# Patient Record
Sex: MALE | Race: WHITE | NOT HISPANIC OR LATINO | Employment: OTHER | URBAN - METROPOLITAN AREA
[De-identification: names, ages, dates, MRNs, and addresses within clinical notes are randomized per-mention and may not be internally consistent; named-entity substitution may affect disease eponyms.]

---

## 2017-07-26 ENCOUNTER — RECORDS - HEALTHEAST (OUTPATIENT)
Dept: LAB | Facility: CLINIC | Age: 72
End: 2017-07-26

## 2017-07-27 LAB — HBA1C MFR BLD: 7.4 % (ref 4.2–6.1)

## 2018-01-12 ENCOUNTER — RECORDS - HEALTHEAST (OUTPATIENT)
Dept: LAB | Facility: CLINIC | Age: 73
End: 2018-01-12

## 2018-01-12 LAB
ERYTHROCYTE [SEDIMENTATION RATE] IN BLOOD BY WESTERGREN METHOD: 12 MM/HR (ref 0–15)
URATE SERPL-MCNC: 6.2 MG/DL (ref 3–8)

## 2018-01-14 LAB — HBA1C MFR BLD: 6.3 % (ref 4.2–6.1)

## 2018-01-18 ENCOUNTER — RECORDS - HEALTHEAST (OUTPATIENT)
Dept: LAB | Facility: CLINIC | Age: 73
End: 2018-01-18

## 2018-01-22 LAB
Lab: 18 NG/ML
Lab: 30 UG/24HR
Lab: <1 NG/ML
Lab: <2 UG/24 HR
Lab: <5 NG/ML
Lab: <8 UG/24HR
SPECIMEN STATUS: ABNORMAL
SPECIMEN VOL UR: 1650 ML

## 2018-03-19 ENCOUNTER — RECORDS - HEALTHEAST (OUTPATIENT)
Dept: LAB | Facility: CLINIC | Age: 73
End: 2018-03-19

## 2018-03-19 LAB
COLLECTION METHOD: NORMAL
LEAD BLD-MCNC: NORMAL UG/DL
LEAD RETEST: NO

## 2018-03-20 LAB
GUARDIAN FIRST NAME: NORMAL
GUARDIAN LAST NAME: NORMAL
HEALTH CARE PROVIDER CITY: NORMAL
HEALTH CARE PROVIDER NAME: NORMAL
HEALTH CARE PROVIDER PHONE: NORMAL
HEALTH CARE PROVIDER STATE: NORMAL
HEALTH CARE PROVIDER STREET ADDRESS: NORMAL
HEALTH CARE PROVIDER ZIP CODE: NORMAL
LEAD, B: <1 MCG/DL (ref 0–4.9)
PATIENT CITY: NORMAL
PATIENT COUNTY: NORMAL
PATIENT EMPLOYER: NORMAL
PATIENT ETHNICITY: NORMAL
PATIENT HOME PHONE: NORMAL
PATIENT OCCUPATION: NORMAL
PATIENT RACE: NORMAL
PATIENT STATE: NORMAL
PATIENT STREET ADDRESS: NORMAL
PATIENT ZIP CODE: NORMAL
SUBMITTING LABORATORY PHONE: NORMAL
VENOUS/CAPILLARY: NORMAL

## 2018-03-22 LAB
ARSENIC, WHOLE BLOOD: <10 NG/ML
MERCURY, WHOLE BLOOD - HISTORICAL: <5 NG/ML
SPECIMEN STATUS: NORMAL
SPECIMEN STATUS: NORMAL

## 2018-12-20 ENCOUNTER — RECORDS - HEALTHEAST (OUTPATIENT)
Dept: LAB | Facility: CLINIC | Age: 73
End: 2018-12-20

## 2018-12-20 LAB
ALBUMIN SERPL-MCNC: 3.7 G/DL (ref 3.5–5)
ALP SERPL-CCNC: 56 U/L (ref 45–120)
ALT SERPL W P-5'-P-CCNC: 67 U/L (ref 0–45)
ANION GAP SERPL CALCULATED.3IONS-SCNC: 12 MMOL/L (ref 5–18)
AST SERPL W P-5'-P-CCNC: 66 U/L (ref 0–40)
BASOPHILS # BLD AUTO: 0.2 THOU/UL (ref 0–0.2)
BASOPHILS NFR BLD AUTO: 2 % (ref 0–2)
BILIRUB SERPL-MCNC: 0.7 MG/DL (ref 0–1)
BUN SERPL-MCNC: 17 MG/DL (ref 8–28)
C REACTIVE PROTEIN LHE: 0.6 MG/DL (ref 0–0.8)
CALCIUM SERPL-MCNC: 9.7 MG/DL (ref 8.5–10.5)
CHLORIDE BLD-SCNC: 102 MMOL/L (ref 98–107)
CHOLEST SERPL-MCNC: 166 MG/DL
CO2 SERPL-SCNC: 24 MMOL/L (ref 22–31)
CREAT SERPL-MCNC: 0.84 MG/DL (ref 0.7–1.3)
EOSINOPHIL # BLD AUTO: 0.4 THOU/UL (ref 0–0.4)
EOSINOPHIL NFR BLD AUTO: 4 % (ref 0–6)
ERYTHROCYTE [DISTWIDTH] IN BLOOD BY AUTOMATED COUNT: 12.7 % (ref 11–14.5)
FASTING STATUS PATIENT QL REPORTED: NO
GFR SERPL CREATININE-BSD FRML MDRD: >60 ML/MIN/1.73M2
GLUCOSE BLD-MCNC: 218 MG/DL (ref 70–125)
HCT VFR BLD AUTO: 41.8 % (ref 40–54)
HDLC SERPL-MCNC: 36 MG/DL
HGB BLD-MCNC: 14.2 G/DL (ref 14–18)
LDLC SERPL CALC-MCNC: 61 MG/DL
LYMPHOCYTES # BLD AUTO: 2.4 THOU/UL (ref 0.8–4.4)
LYMPHOCYTES NFR BLD AUTO: 25 % (ref 20–40)
MCH RBC QN AUTO: 31.2 PG (ref 27–34)
MCHC RBC AUTO-ENTMCNC: 34 G/DL (ref 32–36)
MCV RBC AUTO: 92 FL (ref 80–100)
MONOCYTES # BLD AUTO: 0.8 THOU/UL (ref 0–0.9)
MONOCYTES NFR BLD AUTO: 8 % (ref 2–10)
NEUTROPHILS # BLD AUTO: 5.9 THOU/UL (ref 2–7.7)
NEUTROPHILS NFR BLD AUTO: 61 % (ref 50–70)
PLATELET # BLD AUTO: 249 THOU/UL (ref 140–440)
PMV BLD AUTO: 10.4 FL (ref 8.5–12.5)
POTASSIUM BLD-SCNC: 4.5 MMOL/L (ref 3.5–5)
PROT SERPL-MCNC: 6.8 G/DL (ref 6–8)
RBC # BLD AUTO: 4.55 MILL/UL (ref 4.4–6.2)
SODIUM SERPL-SCNC: 138 MMOL/L (ref 136–145)
TRIGL SERPL-MCNC: 343 MG/DL
WBC: 9.8 THOU/UL (ref 4–11)

## 2018-12-21 LAB — HBA1C MFR BLD: 8.1 % (ref 4.2–6.1)

## 2019-05-30 ENCOUNTER — RECORDS - HEALTHEAST (OUTPATIENT)
Dept: LAB | Facility: CLINIC | Age: 74
End: 2019-05-30

## 2019-05-30 LAB
BASOPHILS # BLD AUTO: 0.1 THOU/UL (ref 0–0.2)
BASOPHILS NFR BLD AUTO: 1 % (ref 0–2)
CRP SERPL HS-MCNC: 6.6 MG/L (ref 0–3)
EOSINOPHIL # BLD AUTO: 0.3 THOU/UL (ref 0–0.4)
EOSINOPHIL NFR BLD AUTO: 3 % (ref 0–6)
ERYTHROCYTE [DISTWIDTH] IN BLOOD BY AUTOMATED COUNT: 12.3 % (ref 11–14.5)
ERYTHROCYTE [SEDIMENTATION RATE] IN BLOOD BY WESTERGREN METHOD: 20 MM/HR (ref 0–15)
HCT VFR BLD AUTO: 41.6 % (ref 40–54)
HGB BLD-MCNC: 14.5 G/DL (ref 14–18)
LYMPHOCYTES # BLD AUTO: 2.4 THOU/UL (ref 0.8–4.4)
LYMPHOCYTES NFR BLD AUTO: 25 % (ref 20–40)
MCH RBC QN AUTO: 31.3 PG (ref 27–34)
MCHC RBC AUTO-ENTMCNC: 34.9 G/DL (ref 32–36)
MCV RBC AUTO: 90 FL (ref 80–100)
MONOCYTES # BLD AUTO: 0.8 THOU/UL (ref 0–0.9)
MONOCYTES NFR BLD AUTO: 8 % (ref 2–10)
NEUTROPHILS # BLD AUTO: 6.1 THOU/UL (ref 2–7.7)
NEUTROPHILS NFR BLD AUTO: 63 % (ref 50–70)
PLATELET # BLD AUTO: 233 THOU/UL (ref 140–440)
PMV BLD AUTO: 10 FL (ref 8.5–12.5)
RBC # BLD AUTO: 4.64 MILL/UL (ref 4.4–6.2)
URATE SERPL-MCNC: 7.5 MG/DL (ref 3–8)
WBC: 9.7 THOU/UL (ref 4–11)

## 2020-08-07 ENCOUNTER — AMBULATORY - HEALTHEAST (OUTPATIENT)
Dept: VASCULAR SURGERY | Facility: CLINIC | Age: 75
End: 2020-08-07

## 2020-08-07 ENCOUNTER — RECORDS - HEALTHEAST (OUTPATIENT)
Dept: LAB | Facility: CLINIC | Age: 75
End: 2020-08-07

## 2020-08-07 DIAGNOSIS — L97.509 DIABETIC FOOT ULCER (H): ICD-10-CM

## 2020-08-07 DIAGNOSIS — E11.621 DIABETIC FOOT ULCER (H): ICD-10-CM

## 2020-08-07 LAB
ALBUMIN SERPL-MCNC: 3.8 G/DL (ref 3.5–5)
ALP SERPL-CCNC: 60 U/L (ref 45–120)
ALT SERPL W P-5'-P-CCNC: 29 U/L (ref 0–45)
ANION GAP SERPL CALCULATED.3IONS-SCNC: 10 MMOL/L (ref 5–18)
AST SERPL W P-5'-P-CCNC: 24 U/L (ref 0–40)
BASOPHILS # BLD AUTO: 0.1 THOU/UL (ref 0–0.2)
BASOPHILS NFR BLD AUTO: 1 % (ref 0–2)
BILIRUB SERPL-MCNC: 0.5 MG/DL (ref 0–1)
BUN SERPL-MCNC: 23 MG/DL (ref 8–28)
CALCIUM SERPL-MCNC: 9.7 MG/DL (ref 8.5–10.5)
CHLORIDE BLD-SCNC: 103 MMOL/L (ref 98–107)
CHOLEST SERPL-MCNC: 175 MG/DL
CO2 SERPL-SCNC: 23 MMOL/L (ref 22–31)
CREAT SERPL-MCNC: 1.1 MG/DL (ref 0.7–1.3)
EOSINOPHIL # BLD AUTO: 0.4 THOU/UL (ref 0–0.4)
EOSINOPHIL NFR BLD AUTO: 5 % (ref 0–6)
ERYTHROCYTE [DISTWIDTH] IN BLOOD BY AUTOMATED COUNT: 12.6 % (ref 11–14.5)
ERYTHROCYTE [SEDIMENTATION RATE] IN BLOOD BY WESTERGREN METHOD: 25 MM/HR (ref 0–15)
FASTING STATUS PATIENT QL REPORTED: ABNORMAL
GFR SERPL CREATININE-BSD FRML MDRD: >60 ML/MIN/1.73M2
GLUCOSE BLD-MCNC: 298 MG/DL (ref 70–125)
HCT VFR BLD AUTO: 38.5 % (ref 40–54)
HDLC SERPL-MCNC: 36 MG/DL
HGB BLD-MCNC: 13.2 G/DL (ref 14–18)
LDLC SERPL CALC-MCNC: 72 MG/DL
LYMPHOCYTES # BLD AUTO: 2.7 THOU/UL (ref 0.8–4.4)
LYMPHOCYTES NFR BLD AUTO: 31 % (ref 20–40)
MCH RBC QN AUTO: 30.6 PG (ref 27–34)
MCHC RBC AUTO-ENTMCNC: 34.3 G/DL (ref 32–36)
MCV RBC AUTO: 89 FL (ref 80–100)
MONOCYTES # BLD AUTO: 0.8 THOU/UL (ref 0–0.9)
MONOCYTES NFR BLD AUTO: 10 % (ref 2–10)
NEUTROPHILS # BLD AUTO: 4.6 THOU/UL (ref 2–7.7)
NEUTROPHILS NFR BLD AUTO: 53 % (ref 50–70)
PLATELET # BLD AUTO: 260 THOU/UL (ref 140–440)
PMV BLD AUTO: 10.4 FL (ref 8.5–12.5)
POTASSIUM BLD-SCNC: 4.8 MMOL/L (ref 3.5–5)
PROT SERPL-MCNC: 7.1 G/DL (ref 6–8)
PSA SERPL-MCNC: 2 NG/ML (ref 0–6.5)
RBC # BLD AUTO: 4.32 MILL/UL (ref 4.4–6.2)
SODIUM SERPL-SCNC: 136 MMOL/L (ref 136–145)
TRIGL SERPL-MCNC: 334 MG/DL
WBC: 8.6 THOU/UL (ref 4–11)

## 2020-08-09 LAB — HBA1C MFR BLD: 7.4 %

## 2020-08-19 ENCOUNTER — THERAPY VISIT (OUTPATIENT)
Dept: PHYSICAL THERAPY | Facility: CLINIC | Age: 75
End: 2020-08-19
Payer: COMMERCIAL

## 2020-08-19 DIAGNOSIS — M25.511 SHOULDER PAIN, RIGHT: ICD-10-CM

## 2020-08-19 PROCEDURE — 97110 THERAPEUTIC EXERCISES: CPT | Mod: GP | Performed by: PHYSICAL THERAPIST

## 2020-08-19 PROCEDURE — 97112 NEUROMUSCULAR REEDUCATION: CPT | Mod: GP | Performed by: PHYSICAL THERAPIST

## 2020-08-19 PROCEDURE — 97162 PT EVAL MOD COMPLEX 30 MIN: CPT | Mod: GP | Performed by: PHYSICAL THERAPIST

## 2020-08-19 NOTE — PROGRESS NOTES
[facility] Initial Evaluation  Subjective:    Therapist Generated HPI Evaluation  Problem details: Pt reports an onset of R lateral shoulder pain in 08/2019 after prolonged overhead UE activity while working on a garage door. Pt reports pain is worse with reaching/lifting overhead, reaching behind back, and laying on involved side. Pt reports mild improvement with heat and tylenol. .         Type of problem:  Right shoulder.    This is a chronic condition.  Condition occurred with:  Repetition/overuse.    Patient reports pain:  Lateral.    Pain radiates to:  Upper arm.     Associated symptoms:  Loss of motion/stiffness, loss of strength and painful arc. Symptoms are exacerbated by lifting, lying on extremity, using arm at shoulder level, using arm behind back and using arm overhead  and relieved by heat and NSAID's.          Patient Health History  Symptom: R shoulder.     Date of Onset: 08/2019.      Pain is reported as 5/10 on pain scale.  General health as reported by patient is fair.  Pertinent medical history includes: asthma, diabetes, high blood pressure and overweight.   Red flags:  None as reported by patient.     Surgeries include:  Orthopedic surgery (Cervical, Lumbar Fusion ).    Current medications:  Cardiac medication and high blood pressure medication.    Occupation: retired.                                       Objective:  Standing Alignment:    Cervical/Thoracic:  Thoracic kyphosis increased and forward head                                         Shoulder Evaluation:  ROM:  AROM:    Flexion:  Left:  130    Right:  110    Abduction:  Left: 120   Right:  100    Internal Rotation:  Left:  Thumb to L1     Right:  Thumb to greater trochanter  External Rotation:  Left:  60    Right:  10                PROM:    Flexion:  Left:  130    Right: 110      Abduction:  Left:  120    Right:  105      External Rotation:  Left:  65    Right:  15                    Strength:        Abduction:   Left: 5/5  Pain:    Right: 4-/5   Weak/painful    Pain:    Internal Rotation:  Left:5/5     Pain:    Right: 5/5     Pain:  External Rotation:   Left:5-/5     Pain:   Right:3+/5    Weak/painful    Pain:++                Palpation:      Right shoulder tenderness present at: Supraspinatus and Infraspinatus  Mobility Tests:      Glenohumeral posterior right:  Hypomobile                                             General     ROS    Assessment/Plan:    Patient is a 75 year old male with right side shoulder complaints.    Patient has the following significant findings with corresponding treatment plan.                Diagnosis 1:  R shoulder pain  Pain -  manual therapy, splint/taping/bracing/orthotics and self management  Decreased ROM/flexibility - manual therapy and therapeutic exercise  Decreased joint mobility - manual therapy and therapeutic exercise  Decreased strength - therapeutic exercise and therapeutic activities  Impaired muscle performance - neuro re-education  Impaired posture - neuro re-education    Therapy Evaluation Codes:   1) History comprised of:   Personal factors that impact the plan of care:      Age, Past/current experiences and Time since onset of symptoms.    Comorbidity factors that impact the plan of care are:      Heart problems and Overweight.     Medications impacting care: Cardiac.  2) Examination of Body Systems comprised of:   Body structures and functions that impact the plan of care:      Cervical spine and Shoulder.   Activity limitations that impact the plan of care are:      Bathing, Cooking, Driving, Dressing and Lifting.  3) Clinical presentation characteristics are:   Evolving/Changing.  4) Decision-Making    Moderate complexity using standardized patient assessment instrument and/or measureable assessment of functional outcome.  Cumulative Therapy Evaluation is: Moderate complexity.    Previous and current functional limitations:  (See Goal Flow Sheet for this information)     Short term and Long term goals: (See Goal Flow Sheet for this information)     Communication ability:  Patient appears to be able to clearly communicate and understand verbal and written communication and follow directions correctly.  Treatment Explanation - The following has been discussed with the patient:   RX ordered/plan of care  Anticipated outcomes  Possible risks and side effects  This patient would benefit from PT intervention to resume normal activities.   Rehab potential is good.    Frequency:  1 X week, once daily  Duration:  for 6 weeks  Discharge Plan:  Achieve all LTG.  Independent in home treatment program.  Reach maximal therapeutic benefit.    Please refer to the daily flowsheet for treatment today, total treatment time and time spent performing 1:1 timed codes.

## 2020-08-21 ENCOUNTER — COMMUNICATION - HEALTHEAST (OUTPATIENT)
Dept: VASCULAR SURGERY | Facility: CLINIC | Age: 75
End: 2020-08-21

## 2020-08-24 ENCOUNTER — OFFICE VISIT - HEALTHEAST (OUTPATIENT)
Dept: VASCULAR SURGERY | Facility: CLINIC | Age: 75
End: 2020-08-24

## 2020-08-24 DIAGNOSIS — L97.521 DIABETIC ULCER OF LEFT FOOT ASSOCIATED WITH TYPE 2 DIABETES MELLITUS, LIMITED TO BREAKDOWN OF SKIN, UNSPECIFIED PART OF FOOT (H): ICD-10-CM

## 2020-08-24 DIAGNOSIS — E11.621 DIABETIC ULCER OF LEFT FOOT ASSOCIATED WITH TYPE 2 DIABETES MELLITUS, LIMITED TO BREAKDOWN OF SKIN, UNSPECIFIED PART OF FOOT (H): ICD-10-CM

## 2020-08-24 DIAGNOSIS — M25.372 ANKLE INSTABILITY, LEFT: ICD-10-CM

## 2020-08-24 ASSESSMENT — MIFFLIN-ST. JEOR: SCORE: 1682.93

## 2020-09-04 ENCOUNTER — THERAPY VISIT (OUTPATIENT)
Dept: PHYSICAL THERAPY | Facility: CLINIC | Age: 75
End: 2020-09-04
Payer: COMMERCIAL

## 2020-09-04 DIAGNOSIS — M25.511 SHOULDER PAIN, RIGHT: ICD-10-CM

## 2020-09-04 PROCEDURE — 97110 THERAPEUTIC EXERCISES: CPT | Mod: GP | Performed by: PHYSICAL THERAPIST

## 2020-09-04 PROCEDURE — 97112 NEUROMUSCULAR REEDUCATION: CPT | Mod: GP | Performed by: PHYSICAL THERAPIST

## 2020-09-11 ENCOUNTER — THERAPY VISIT (OUTPATIENT)
Dept: PHYSICAL THERAPY | Facility: CLINIC | Age: 75
End: 2020-09-11
Payer: COMMERCIAL

## 2020-09-11 DIAGNOSIS — M25.511 SHOULDER PAIN, RIGHT: ICD-10-CM

## 2020-09-11 PROCEDURE — 97110 THERAPEUTIC EXERCISES: CPT | Mod: GP | Performed by: PHYSICAL THERAPIST

## 2020-09-11 PROCEDURE — 97112 NEUROMUSCULAR REEDUCATION: CPT | Mod: GP | Performed by: PHYSICAL THERAPIST

## 2020-09-16 ENCOUNTER — OFFICE VISIT - HEALTHEAST (OUTPATIENT)
Dept: VASCULAR SURGERY | Facility: CLINIC | Age: 75
End: 2020-09-16

## 2020-09-16 DIAGNOSIS — E11.621 DIABETIC ULCER OF LEFT FOOT ASSOCIATED WITH TYPE 2 DIABETES MELLITUS, LIMITED TO BREAKDOWN OF SKIN, UNSPECIFIED PART OF FOOT (H): ICD-10-CM

## 2020-09-16 DIAGNOSIS — L97.521 DIABETIC ULCER OF LEFT FOOT ASSOCIATED WITH TYPE 2 DIABETES MELLITUS, LIMITED TO BREAKDOWN OF SKIN, UNSPECIFIED PART OF FOOT (H): ICD-10-CM

## 2020-09-18 ENCOUNTER — THERAPY VISIT (OUTPATIENT)
Dept: PHYSICAL THERAPY | Facility: CLINIC | Age: 75
End: 2020-09-18
Payer: COMMERCIAL

## 2020-09-18 DIAGNOSIS — M25.511 SHOULDER PAIN, RIGHT: ICD-10-CM

## 2020-09-18 PROCEDURE — 97110 THERAPEUTIC EXERCISES: CPT | Mod: GP | Performed by: PHYSICAL THERAPIST

## 2020-09-18 PROCEDURE — 97112 NEUROMUSCULAR REEDUCATION: CPT | Mod: GP | Performed by: PHYSICAL THERAPIST

## 2020-09-18 NOTE — PROGRESS NOTES
PROGRESS  REPORT    Progress reporting period is from 8/19/20 to 9/18/20. Pt has attended 4 PT sessions addressing patient's chief complaints of R lateral shoulder pain w/ an onset in 08/2019 after prolonged overhead UE activity while working on a garage door.     Pt reports minimal improvement since the onset of pain and continues to have difficulty reaching his arm overhead. Examination reveals painful loss of R shoulder ER MMT: 4-/5 and PT recommends patient f/u with MD due to RTC weakness and lack of improvement.     SUBJECTIVE  Subjective changes noted by patient:  Pt reports feeling about the same overall.        Initial Pain level: 4/10.   Changes in function: No  Adverse reaction to treatment or activity: None    OBJECTIVE  Changes noted in objective findings:  Yes,   Shoulder Evaluation:  ROM:  AROM:    Flexion:  Left:  130    Right:  110     Abduction:  Left: 120   Right:  100     Internal Rotation:  Left:  Thumb to L1     Right:  Thumb to greater trochanter  External Rotation:  Left:  60    Right:  10           PROM:    Flexion:  Left:  130    Right: 110       Abduction:  Left:  120    Right:  105        External Rotation:  Left:  65    Right:  15     Strength:          Abduction:  Left: 5/5  Pain:    Right: 4-/5   Weak/painful    Pain:     Internal Rotation:  Left:5/5     Pain:    Right: 5/5     Pain:  External Rotation:   Left:5-/5     Pain:   Right:4-/5    Weak/painful    Pain:++        ASSESSMENT/PLAN  Updated problem list and treatment plan: Diagnosis 1:  R RTC tear    STG/LTGs have been met or progress has been made towards goals:  None  Assessment of Progress: The patient's condition is unchanged.  Self Management Plans:  Patient has been instructed in a home treatment program.  I have re-evaluated this patient and find that the nature, scope, duration and intensity of the therapy is appropriate for the medical condition of the patient.  Prosper continues to require the following intervention to  meet STG and LTG's:  PT intervention is no longer required to meet STG/LTG.    Recommendations:  This patient would benefit from further evaluation.    Please refer to the daily flowsheet for treatment today, total treatment time and time spent performing 1:1 timed codes.

## 2020-09-18 NOTE — LETTER
[facility] PHYSICAL THERAPY  [address] [place] [address]  [phone]    2020    Re: Prosper Kirby   :   1945  MRN:  [id number]   REFERRING PHYSICIAN:   Alin Fuentes    [facility] PHYSICAL [facility]    Date of Initial Evaluation:  2020  Visits:  Rxs Used: 4  Reason for Referral:  Shoulder pain, right    EVALUATION SUMMARY    PROGRESS  REPORT    Progress reporting period is from 20 to 20. Pt has attended 4 PT sessions addressing patient's chief complaints of R lateral shoulder pain w/ an onset in 2019 after prolonged overhead UE activity while working on a garage door. Pt reports minimal improvement since the onset of pain and continues to have difficulty reaching his arm overhead. Examination reveals painful loss of R shoulder ER MMT: 4-/5 and PT recommends patient f/u with MD due to RTC weakness and lack of improvement.     SUBJECTIVE  Subjective changes noted by patient:  Pt reports feeling about the same overall.        Initial Pain level: 4/10.   Changes in function: No  Adverse reaction to treatment or activity: None    OBJECTIVE  Changes noted in objective findings:  Yes,   Shoulder Evaluation:  ROM:  AROM:    Flexion:  Left:  130    Right:  110   Abduction:  Left: 120   Right:  100   Internal Rotation:  Left:  Thumb to L1     Right:  Thumb to greater trochanter  External Rotation:  Left:  60    Right:  10   PROM:    Flexion:  Left:  130    Right: 110    Abduction:  Left:  120    Right:  105  External Rotation:  Left:  65    Right:  15   Strength:     Abduction:  Left: 5/5  Pain:    Right: 4-/5   Weak/painful    Pain:   Internal Rotation:  Left:5/5     Pain:    Right: 5/5     Pain:  External Rotation:   Left:5-/5     Pain:   Right:4-/5    Weak/painful    Pain:++        ASSESSMENT/PLAN  Updated problem list and treatment plan: Diagnosis 1:  R RTC tear    STG/LTGs have been met or  progress has been made towards goals:  None  Assessment of Progress: The patient's condition is unchanged.  Self Management Plans:  Patient has been instructed in a home treatment program.  I have re-evaluated this patient and find that the nature, scope, duration and intensity of the therapy is appropriate for the medical condition of the patient.  Prosper continues to require the following intervention to meet STG and LTG's:  PT intervention is no longer required to meet STG/LTG.    Recommendations:  This patient would benefit from further evaluation.        Thank you for your referral.    INQUIRIES  Therapist: Luther Reddy DPT   [facility] PHYSICAL THERAPY  [address] [place] [address]  Phone: [phone]  Fax: [phone]

## 2020-10-07 ENCOUNTER — OFFICE VISIT - HEALTHEAST (OUTPATIENT)
Dept: VASCULAR SURGERY | Facility: CLINIC | Age: 75
End: 2020-10-07

## 2020-10-07 DIAGNOSIS — L97.521 DIABETIC ULCER OF LEFT FOOT ASSOCIATED WITH TYPE 2 DIABETES MELLITUS, LIMITED TO BREAKDOWN OF SKIN, UNSPECIFIED PART OF FOOT (H): ICD-10-CM

## 2020-10-07 DIAGNOSIS — E11.621 DIABETIC ULCER OF LEFT FOOT ASSOCIATED WITH TYPE 2 DIABETES MELLITUS, LIMITED TO BREAKDOWN OF SKIN, UNSPECIFIED PART OF FOOT (H): ICD-10-CM

## 2020-10-28 ENCOUNTER — OFFICE VISIT - HEALTHEAST (OUTPATIENT)
Dept: VASCULAR SURGERY | Facility: CLINIC | Age: 75
End: 2020-10-28

## 2020-10-28 DIAGNOSIS — E11.621 DIABETIC ULCER OF OTHER PART OF LEFT FOOT ASSOCIATED WITH TYPE 2 DIABETES MELLITUS, WITH NECROSIS OF MUSCLE (H): ICD-10-CM

## 2020-10-28 DIAGNOSIS — L97.523 DIABETIC ULCER OF OTHER PART OF LEFT FOOT ASSOCIATED WITH TYPE 2 DIABETES MELLITUS, WITH NECROSIS OF MUSCLE (H): ICD-10-CM

## 2020-10-28 ASSESSMENT — MIFFLIN-ST. JEOR: SCORE: 1692.01

## 2020-10-30 ENCOUNTER — AMBULATORY - HEALTHEAST (OUTPATIENT)
Dept: VASCULAR SURGERY | Facility: CLINIC | Age: 75
End: 2020-10-30

## 2020-10-30 DIAGNOSIS — E11.621 DIABETIC ULCER OF OTHER PART OF LEFT FOOT ASSOCIATED WITH TYPE 2 DIABETES MELLITUS, WITH NECROSIS OF MUSCLE (H): ICD-10-CM

## 2020-10-30 DIAGNOSIS — L97.523 DIABETIC ULCER OF OTHER PART OF LEFT FOOT ASSOCIATED WITH TYPE 2 DIABETES MELLITUS, WITH NECROSIS OF MUSCLE (H): ICD-10-CM

## 2020-10-31 ENCOUNTER — HOME CARE/HOSPICE - HEALTHEAST (OUTPATIENT)
Dept: HOME HEALTH SERVICES | Facility: HOME HEALTH | Age: 75
End: 2020-10-31

## 2020-11-13 ENCOUNTER — COMMUNICATION - HEALTHEAST (OUTPATIENT)
Dept: VASCULAR SURGERY | Facility: CLINIC | Age: 75
End: 2020-11-13

## 2020-11-18 ENCOUNTER — COMMUNICATION - HEALTHEAST (OUTPATIENT)
Dept: VASCULAR SURGERY | Facility: CLINIC | Age: 75
End: 2020-11-18

## 2020-11-18 ENCOUNTER — OFFICE VISIT - HEALTHEAST (OUTPATIENT)
Dept: VASCULAR SURGERY | Facility: CLINIC | Age: 75
End: 2020-11-18

## 2020-11-18 ENCOUNTER — SURGERY - HEALTHEAST (OUTPATIENT)
Dept: VASCULAR SURGERY | Facility: CLINIC | Age: 75
End: 2020-11-18

## 2020-11-18 ENCOUNTER — AMBULATORY - HEALTHEAST (OUTPATIENT)
Dept: SURGERY | Facility: HOSPITAL | Age: 75
End: 2020-11-18

## 2020-11-18 DIAGNOSIS — Z11.59 ENCOUNTER FOR SCREENING FOR OTHER VIRAL DISEASES: ICD-10-CM

## 2020-11-18 DIAGNOSIS — L97.523 DIABETIC ULCER OF LEFT FOOT ASSOCIATED WITH TYPE 2 DIABETES MELLITUS, WITH NECROSIS OF MUSCLE, UNSPECIFIED PART OF FOOT (H): ICD-10-CM

## 2020-11-18 DIAGNOSIS — E11.621 DIABETIC ULCER OF OTHER PART OF LEFT FOOT ASSOCIATED WITH TYPE 2 DIABETES MELLITUS, WITH NECROSIS OF MUSCLE (H): ICD-10-CM

## 2020-11-18 DIAGNOSIS — L97.523 DIABETIC ULCER OF OTHER PART OF LEFT FOOT ASSOCIATED WITH TYPE 2 DIABETES MELLITUS, WITH NECROSIS OF MUSCLE (H): ICD-10-CM

## 2020-11-18 DIAGNOSIS — E11.621 DIABETIC ULCER OF LEFT FOOT ASSOCIATED WITH TYPE 2 DIABETES MELLITUS, WITH NECROSIS OF MUSCLE, UNSPECIFIED PART OF FOOT (H): ICD-10-CM

## 2020-11-25 ENCOUNTER — RECORDS - HEALTHEAST (OUTPATIENT)
Dept: LAB | Facility: CLINIC | Age: 75
End: 2020-11-25

## 2020-11-25 LAB
HGB BLD-MCNC: 13.5 G/DL (ref 14–18)
POTASSIUM BLD-SCNC: 4.9 MMOL/L (ref 3.5–5)

## 2020-11-30 ENCOUNTER — AMBULATORY - HEALTHEAST (OUTPATIENT)
Dept: LAB | Facility: CLINIC | Age: 75
End: 2020-11-30

## 2020-11-30 DIAGNOSIS — Z11.59 ENCOUNTER FOR SCREENING FOR OTHER VIRAL DISEASES: ICD-10-CM

## 2020-12-02 ENCOUNTER — COMMUNICATION - HEALTHEAST (OUTPATIENT)
Dept: SCHEDULING | Facility: CLINIC | Age: 75
End: 2020-12-02

## 2020-12-02 ENCOUNTER — ANESTHESIA - HEALTHEAST (OUTPATIENT)
Dept: SURGERY | Facility: HOSPITAL | Age: 75
End: 2020-12-02

## 2020-12-02 ASSESSMENT — MIFFLIN-ST. JEOR: SCORE: 1705.61

## 2020-12-03 ENCOUNTER — SURGERY - HEALTHEAST (OUTPATIENT)
Dept: SURGERY | Facility: HOSPITAL | Age: 75
End: 2020-12-03

## 2020-12-07 ENCOUNTER — COMMUNICATION - HEALTHEAST (OUTPATIENT)
Dept: VASCULAR SURGERY | Facility: CLINIC | Age: 75
End: 2020-12-07

## 2020-12-09 ENCOUNTER — RECORDS - HEALTHEAST (OUTPATIENT)
Dept: ADMINISTRATIVE | Facility: OTHER | Age: 75
End: 2020-12-09

## 2020-12-10 ENCOUNTER — OFFICE VISIT - HEALTHEAST (OUTPATIENT)
Dept: VASCULAR SURGERY | Facility: CLINIC | Age: 75
End: 2020-12-10

## 2020-12-10 ENCOUNTER — COMMUNICATION - HEALTHEAST (OUTPATIENT)
Dept: VASCULAR SURGERY | Facility: CLINIC | Age: 75
End: 2020-12-10

## 2020-12-10 DIAGNOSIS — L97.523 DIABETIC ULCER OF LEFT FOOT ASSOCIATED WITH TYPE 2 DIABETES MELLITUS, WITH NECROSIS OF MUSCLE, UNSPECIFIED PART OF FOOT (H): ICD-10-CM

## 2020-12-10 DIAGNOSIS — E11.621 DIABETIC ULCER OF LEFT FOOT ASSOCIATED WITH TYPE 2 DIABETES MELLITUS, WITH NECROSIS OF MUSCLE, UNSPECIFIED PART OF FOOT (H): ICD-10-CM

## 2020-12-23 ENCOUNTER — OFFICE VISIT - HEALTHEAST (OUTPATIENT)
Dept: VASCULAR SURGERY | Facility: CLINIC | Age: 75
End: 2020-12-23

## 2020-12-23 DIAGNOSIS — E11.621 DIABETIC ULCER OF LEFT FOOT ASSOCIATED WITH TYPE 2 DIABETES MELLITUS, WITH NECROSIS OF MUSCLE, UNSPECIFIED PART OF FOOT (H): ICD-10-CM

## 2020-12-23 DIAGNOSIS — L97.523 DIABETIC ULCER OF LEFT FOOT ASSOCIATED WITH TYPE 2 DIABETES MELLITUS, WITH NECROSIS OF MUSCLE, UNSPECIFIED PART OF FOOT (H): ICD-10-CM

## 2021-01-05 ENCOUNTER — COMMUNICATION - HEALTHEAST (OUTPATIENT)
Dept: VASCULAR SURGERY | Facility: CLINIC | Age: 76
End: 2021-01-05

## 2021-01-05 ENCOUNTER — SURGERY - HEALTHEAST (OUTPATIENT)
Dept: PODIATRY | Facility: CLINIC | Age: 76
End: 2021-01-05

## 2021-01-05 DIAGNOSIS — M86.9 OSTEOMYELITIS OF ANKLE AND FOOT (H): ICD-10-CM

## 2021-01-05 ASSESSMENT — MIFFLIN-ST. JEOR
SCORE: 1705.61
SCORE: 1692.01
SCORE: 1692.01
SCORE: 1705.61

## 2021-01-06 ENCOUNTER — SURGERY - HEALTHEAST (OUTPATIENT)
Dept: SURGERY | Facility: HOSPITAL | Age: 76
End: 2021-01-06

## 2021-01-06 ENCOUNTER — ANESTHESIA - HEALTHEAST (OUTPATIENT)
Dept: SURGERY | Facility: HOSPITAL | Age: 76
End: 2021-01-06

## 2021-01-06 ENCOUNTER — SURGERY - HEALTHEAST (OUTPATIENT)
Dept: VASCULAR SURGERY | Facility: CLINIC | Age: 76
End: 2021-01-06

## 2021-01-06 DIAGNOSIS — M86.9 OSTEOMYELITIS OF ANKLE AND FOOT (H): ICD-10-CM

## 2021-01-07 ENCOUNTER — COMMUNICATION - HEALTHEAST (OUTPATIENT)
Dept: VASCULAR SURGERY | Facility: CLINIC | Age: 76
End: 2021-01-07

## 2021-01-08 ENCOUNTER — SURGERY - HEALTHEAST (OUTPATIENT)
Dept: SURGERY | Facility: HOSPITAL | Age: 76
End: 2021-01-08

## 2021-01-08 ENCOUNTER — ANESTHESIA - HEALTHEAST (OUTPATIENT)
Dept: SURGERY | Facility: HOSPITAL | Age: 76
End: 2021-01-08

## 2021-01-14 ENCOUNTER — COMMUNICATION - HEALTHEAST (OUTPATIENT)
Dept: INFECTIOUS DISEASES | Facility: CLINIC | Age: 76
End: 2021-01-14

## 2021-01-19 ENCOUNTER — RECORDS - HEALTHEAST (OUTPATIENT)
Dept: ADMINISTRATIVE | Facility: OTHER | Age: 76
End: 2021-01-19

## 2021-01-19 LAB
CREAT SERPL-MCNC: 0.64 MG/DL (ref 0.73–1.18)
GFR ESTIMATE EXT - HISTORICAL: >60 ML/MIN/1.73M2

## 2021-01-20 ENCOUNTER — OFFICE VISIT - HEALTHEAST (OUTPATIENT)
Dept: VASCULAR SURGERY | Facility: CLINIC | Age: 76
End: 2021-01-20

## 2021-01-20 DIAGNOSIS — M86.9 OSTEOMYELITIS OF ANKLE AND FOOT (H): ICD-10-CM

## 2021-01-26 ENCOUNTER — RECORDS - HEALTHEAST (OUTPATIENT)
Dept: ADMINISTRATIVE | Facility: OTHER | Age: 76
End: 2021-01-26

## 2021-01-26 LAB
CREAT SERPL-MCNC: 0.67 MG/DL (ref 0.73–1.18)
GFR ESTIMATE EXT - HISTORICAL: >60 ML/MIN/1.73M2

## 2021-01-27 ENCOUNTER — COMMUNICATION - HEALTHEAST (OUTPATIENT)
Dept: INFECTIOUS DISEASES | Facility: CLINIC | Age: 76
End: 2021-01-27

## 2021-01-29 ENCOUNTER — COMMUNICATION - HEALTHEAST (OUTPATIENT)
Dept: VASCULAR SURGERY | Facility: CLINIC | Age: 76
End: 2021-01-29

## 2021-02-08 ENCOUNTER — RECORDS - HEALTHEAST (OUTPATIENT)
Dept: HEALTH INFORMATION MANAGEMENT | Facility: CLINIC | Age: 76
End: 2021-02-08

## 2021-02-11 ENCOUNTER — OFFICE VISIT - HEALTHEAST (OUTPATIENT)
Dept: INFECTIOUS DISEASES | Facility: CLINIC | Age: 76
End: 2021-02-11

## 2021-02-11 ENCOUNTER — OFFICE VISIT - HEALTHEAST (OUTPATIENT)
Dept: VASCULAR SURGERY | Facility: CLINIC | Age: 76
End: 2021-02-11

## 2021-02-11 DIAGNOSIS — L08.9 DIABETIC FOOT INFECTION (H): ICD-10-CM

## 2021-02-11 DIAGNOSIS — M86.9 OSTEOMYELITIS OF ANKLE AND FOOT (H): ICD-10-CM

## 2021-02-11 DIAGNOSIS — E11.628 DIABETIC FOOT INFECTION (H): ICD-10-CM

## 2021-02-17 ENCOUNTER — COMMUNICATION - HEALTHEAST (OUTPATIENT)
Dept: OTHER | Facility: CLINIC | Age: 76
End: 2021-02-17

## 2021-02-17 ENCOUNTER — COMMUNICATION - HEALTHEAST (OUTPATIENT)
Dept: VASCULAR SURGERY | Facility: CLINIC | Age: 76
End: 2021-02-17

## 2021-02-18 ENCOUNTER — RECORDS - HEALTHEAST (OUTPATIENT)
Dept: ADMINISTRATIVE | Facility: OTHER | Age: 76
End: 2021-02-18

## 2021-02-19 ENCOUNTER — COMMUNICATION - HEALTHEAST (OUTPATIENT)
Dept: VASCULAR SURGERY | Facility: CLINIC | Age: 76
End: 2021-02-19

## 2021-02-22 ENCOUNTER — RECORDS - HEALTHEAST (OUTPATIENT)
Dept: LAB | Facility: CLINIC | Age: 76
End: 2021-02-22

## 2021-02-22 LAB
CHOLEST SERPL-MCNC: 146 MG/DL
FASTING STATUS PATIENT QL REPORTED: NO
HBA1C MFR BLD: 5.9 %
HDLC SERPL-MCNC: 31 MG/DL
LDLC SERPL CALC-MCNC: 83 MG/DL
TRIGL SERPL-MCNC: 160 MG/DL

## 2021-02-23 ENCOUNTER — AMBULATORY - HEALTHEAST (OUTPATIENT)
Dept: OTHER | Facility: CLINIC | Age: 76
End: 2021-02-23

## 2021-03-04 ENCOUNTER — RECORDS - HEALTHEAST (OUTPATIENT)
Dept: ADMINISTRATIVE | Facility: OTHER | Age: 76
End: 2021-03-04

## 2021-03-08 ENCOUNTER — AMBULATORY - HEALTHEAST (OUTPATIENT)
Dept: NURSING | Facility: CLINIC | Age: 76
End: 2021-03-08

## 2021-03-09 ENCOUNTER — AMBULATORY - HEALTHEAST (OUTPATIENT)
Dept: VASCULAR SURGERY | Facility: CLINIC | Age: 76
End: 2021-03-09

## 2021-03-09 DIAGNOSIS — L97.509 TOE ULCER (H): ICD-10-CM

## 2021-03-10 ENCOUNTER — AMBULATORY - HEALTHEAST (OUTPATIENT)
Dept: OTHER | Facility: CLINIC | Age: 76
End: 2021-03-10

## 2021-03-15 ENCOUNTER — AMBULATORY - HEALTHEAST (OUTPATIENT)
Dept: VASCULAR SURGERY | Facility: CLINIC | Age: 76
End: 2021-03-15

## 2021-03-15 ENCOUNTER — COMMUNICATION - HEALTHEAST (OUTPATIENT)
Dept: VASCULAR SURGERY | Facility: CLINIC | Age: 76
End: 2021-03-15

## 2021-03-15 DIAGNOSIS — M86.9 OSTEOMYELITIS OF ANKLE AND FOOT (H): ICD-10-CM

## 2021-03-15 DIAGNOSIS — I73.9 PAD (PERIPHERAL ARTERY DISEASE) (H): ICD-10-CM

## 2021-03-16 ENCOUNTER — OFFICE VISIT - HEALTHEAST (OUTPATIENT)
Dept: VASCULAR SURGERY | Facility: CLINIC | Age: 76
End: 2021-03-16

## 2021-03-16 ENCOUNTER — RECORDS - HEALTHEAST (OUTPATIENT)
Dept: VASCULAR ULTRASOUND | Facility: CLINIC | Age: 76
End: 2021-03-16

## 2021-03-16 DIAGNOSIS — E11.621 DIABETIC ULCER OF LEFT FOOT ASSOCIATED WITH TYPE 2 DIABETES MELLITUS, WITH NECROSIS OF MUSCLE, UNSPECIFIED PART OF FOOT (H): ICD-10-CM

## 2021-03-16 DIAGNOSIS — I73.9 PERIPHERAL VASCULAR DISEASE, UNSPECIFIED (H): ICD-10-CM

## 2021-03-16 DIAGNOSIS — M86.9 OSTEOMYELITIS, UNSPECIFIED (H): ICD-10-CM

## 2021-03-16 DIAGNOSIS — L97.524: ICD-10-CM

## 2021-03-16 DIAGNOSIS — M86.9 OSTEOMYELITIS OF LEFT FOOT, UNSPECIFIED TYPE (H): ICD-10-CM

## 2021-03-16 DIAGNOSIS — E11.42 DIABETIC PERIPHERAL NEUROPATHY ASSOCIATED WITH TYPE 2 DIABETES MELLITUS (H): ICD-10-CM

## 2021-03-16 DIAGNOSIS — L97.523 DIABETIC ULCER OF LEFT FOOT ASSOCIATED WITH TYPE 2 DIABETES MELLITUS, WITH NECROSIS OF MUSCLE, UNSPECIFIED PART OF FOOT (H): ICD-10-CM

## 2021-03-16 ASSESSMENT — MIFFLIN-ST. JEOR: SCORE: 1628.5

## 2021-03-18 LAB
BACTERIA SPEC CULT: NO GROWTH
GRAM STAIN RESULT: NORMAL
GRAM STAIN RESULT: NORMAL

## 2021-03-22 ENCOUNTER — AMBULATORY - HEALTHEAST (OUTPATIENT)
Dept: VASCULAR SURGERY | Facility: CLINIC | Age: 76
End: 2021-03-22

## 2021-03-22 ENCOUNTER — SURGERY - HEALTHEAST (OUTPATIENT)
Dept: PODIATRY | Facility: CLINIC | Age: 76
End: 2021-03-22

## 2021-03-22 ENCOUNTER — OFFICE VISIT - HEALTHEAST (OUTPATIENT)
Dept: PODIATRY | Facility: CLINIC | Age: 76
End: 2021-03-22

## 2021-03-22 DIAGNOSIS — L02.619 CELLULITIS AND ABSCESS OF FOOT EXCLUDING TOE: ICD-10-CM

## 2021-03-22 DIAGNOSIS — L08.9 DIABETIC FOOT INFECTION (H): ICD-10-CM

## 2021-03-22 DIAGNOSIS — L03.119 CELLULITIS AND ABSCESS OF FOOT EXCLUDING TOE: ICD-10-CM

## 2021-03-22 DIAGNOSIS — L97.524 DIABETIC ULCER OF TOE OF LEFT FOOT ASSOCIATED WITH TYPE 2 DIABETES MELLITUS, WITH NECROSIS OF BONE (H): ICD-10-CM

## 2021-03-22 DIAGNOSIS — M86.9 OSTEOMYELITIS OF ANKLE AND FOOT (H): ICD-10-CM

## 2021-03-22 DIAGNOSIS — E11.628 DIABETIC FOOT INFECTION (H): ICD-10-CM

## 2021-03-22 DIAGNOSIS — E11.621 DIABETIC ULCER OF TOE OF LEFT FOOT ASSOCIATED WITH TYPE 2 DIABETES MELLITUS, WITH NECROSIS OF BONE (H): ICD-10-CM

## 2021-03-23 ENCOUNTER — COMMUNICATION - HEALTHEAST (OUTPATIENT)
Dept: VASCULAR SURGERY | Facility: CLINIC | Age: 76
End: 2021-03-23

## 2021-03-25 ENCOUNTER — RECORDS - HEALTHEAST (OUTPATIENT)
Dept: LAB | Facility: CLINIC | Age: 76
End: 2021-03-25

## 2021-03-25 LAB
ALBUMIN SERPL-MCNC: 3.8 G/DL (ref 3.5–5)
ALP SERPL-CCNC: 67 U/L (ref 45–120)
ALT SERPL W P-5'-P-CCNC: 25 U/L (ref 0–45)
ANION GAP SERPL CALCULATED.3IONS-SCNC: 11 MMOL/L (ref 5–18)
AST SERPL W P-5'-P-CCNC: 25 U/L (ref 0–40)
BASOPHILS # BLD AUTO: 0.1 THOU/UL (ref 0–0.2)
BASOPHILS NFR BLD AUTO: 2 % (ref 0–2)
BILIRUB SERPL-MCNC: 0.4 MG/DL (ref 0–1)
BUN SERPL-MCNC: 25 MG/DL (ref 8–28)
CALCIUM SERPL-MCNC: 9.6 MG/DL (ref 8.5–10.5)
CHLORIDE BLD-SCNC: 101 MMOL/L (ref 98–107)
CO2 SERPL-SCNC: 22 MMOL/L (ref 22–31)
CREAT SERPL-MCNC: 0.9 MG/DL (ref 0.7–1.3)
EOSINOPHIL # BLD AUTO: 0.8 THOU/UL (ref 0–0.4)
EOSINOPHIL NFR BLD AUTO: 10 % (ref 0–6)
ERYTHROCYTE [DISTWIDTH] IN BLOOD BY AUTOMATED COUNT: 14 % (ref 11–14.5)
GFR SERPL CREATININE-BSD FRML MDRD: >60 ML/MIN/1.73M2
GLUCOSE BLD-MCNC: 239 MG/DL (ref 70–125)
HBA1C MFR BLD: 5.7 %
HCT VFR BLD AUTO: 38.3 % (ref 40–54)
HGB BLD-MCNC: 12.9 G/DL (ref 14–18)
IMM GRANULOCYTES # BLD: 0 THOU/UL
IMM GRANULOCYTES NFR BLD: 0 %
LYMPHOCYTES # BLD AUTO: 1.6 THOU/UL (ref 0.8–4.4)
LYMPHOCYTES NFR BLD AUTO: 19 % (ref 20–40)
MCH RBC QN AUTO: 29.8 PG (ref 27–34)
MCHC RBC AUTO-ENTMCNC: 33.7 G/DL (ref 32–36)
MCV RBC AUTO: 89 FL (ref 80–100)
MONOCYTES # BLD AUTO: 0.7 THOU/UL (ref 0–0.9)
MONOCYTES NFR BLD AUTO: 9 % (ref 2–10)
NEUTROPHILS # BLD AUTO: 4.9 THOU/UL (ref 2–7.7)
NEUTROPHILS NFR BLD AUTO: 60 % (ref 50–70)
PLATELET # BLD AUTO: 285 THOU/UL (ref 140–440)
PMV BLD AUTO: 9.6 FL (ref 8.5–12.5)
POTASSIUM BLD-SCNC: 4.9 MMOL/L (ref 3.5–5)
PROT SERPL-MCNC: 7.3 G/DL (ref 6–8)
RBC # BLD AUTO: 4.33 MILL/UL (ref 4.4–6.2)
SODIUM SERPL-SCNC: 134 MMOL/L (ref 136–145)
WBC: 8.1 THOU/UL (ref 4–11)

## 2021-03-29 ENCOUNTER — AMBULATORY - HEALTHEAST (OUTPATIENT)
Dept: NURSING | Facility: CLINIC | Age: 76
End: 2021-03-29

## 2021-03-29 ENCOUNTER — AMBULATORY - HEALTHEAST (OUTPATIENT)
Dept: LAB | Facility: CLINIC | Age: 76
End: 2021-03-29

## 2021-03-29 DIAGNOSIS — L08.9 DIABETIC FOOT INFECTION (H): ICD-10-CM

## 2021-03-29 DIAGNOSIS — E11.628 DIABETIC FOOT INFECTION (H): ICD-10-CM

## 2021-03-30 ENCOUNTER — COMMUNICATION - HEALTHEAST (OUTPATIENT)
Dept: VASCULAR SURGERY | Facility: CLINIC | Age: 76
End: 2021-03-30

## 2021-03-30 LAB
SARS-COV-2 PCR COMMENT: NORMAL
SARS-COV-2 RNA SPEC QL NAA+PROBE: NEGATIVE
SARS-COV-2 VIRUS SPECIMEN SOURCE: NORMAL

## 2021-03-31 ENCOUNTER — COMMUNICATION - HEALTHEAST (OUTPATIENT)
Dept: SCHEDULING | Facility: CLINIC | Age: 76
End: 2021-03-31

## 2021-04-01 ENCOUNTER — SURGERY - HEALTHEAST (OUTPATIENT)
Dept: SURGERY | Facility: HOSPITAL | Age: 76
End: 2021-04-01

## 2021-04-01 ENCOUNTER — ANESTHESIA - HEALTHEAST (OUTPATIENT)
Dept: SURGERY | Facility: HOSPITAL | Age: 76
End: 2021-04-01

## 2021-04-08 ENCOUNTER — OFFICE VISIT - HEALTHEAST (OUTPATIENT)
Dept: VASCULAR SURGERY | Facility: CLINIC | Age: 76
End: 2021-04-08

## 2021-04-08 DIAGNOSIS — M86.9 OSTEOMYELITIS OF ANKLE AND FOOT (H): ICD-10-CM

## 2021-04-22 ENCOUNTER — OFFICE VISIT - HEALTHEAST (OUTPATIENT)
Dept: VASCULAR SURGERY | Facility: CLINIC | Age: 76
End: 2021-04-22

## 2021-04-22 DIAGNOSIS — M86.9 OSTEOMYELITIS OF ANKLE AND FOOT (H): ICD-10-CM

## 2021-04-29 PROBLEM — M25.511 SHOULDER PAIN, RIGHT: Status: RESOLVED | Noted: 2020-08-19 | Resolved: 2021-04-29

## 2021-05-05 ENCOUNTER — NURSE TRIAGE (OUTPATIENT)
Dept: NURSING | Facility: CLINIC | Age: 76
End: 2021-05-05

## 2021-05-05 ENCOUNTER — OFFICE VISIT (OUTPATIENT)
Dept: URGENT CARE | Facility: URGENT CARE | Age: 76
End: 2021-05-05
Payer: COMMERCIAL

## 2021-05-05 ENCOUNTER — COMMUNICATION - HEALTHEAST (OUTPATIENT)
Dept: SCHEDULING | Facility: CLINIC | Age: 76
End: 2021-05-05

## 2021-05-05 ENCOUNTER — HOSPITAL ENCOUNTER (EMERGENCY)
Dept: EMERGENCY MEDICINE | Facility: HOSPITAL | Age: 76
Discharge: HOME | End: 2021-05-05
Attending: FAMILY MEDICINE
Payer: COMMERCIAL

## 2021-05-05 VITALS
BODY MASS INDEX: 29.33 KG/M2 | HEIGHT: 69 IN | TEMPERATURE: 98.1 F | HEART RATE: 82 BPM | SYSTOLIC BLOOD PRESSURE: 126 MMHG | OXYGEN SATURATION: 100 % | WEIGHT: 198 LBS | RESPIRATION RATE: 12 BRPM | DIASTOLIC BLOOD PRESSURE: 56 MMHG

## 2021-05-05 DIAGNOSIS — Z89.429 HISTORY OF AMPUTATION OF TOE (H): ICD-10-CM

## 2021-05-05 DIAGNOSIS — L08.9 LEFT FOOT INFECTION: Primary | ICD-10-CM

## 2021-05-05 DIAGNOSIS — L03.032 CELLULITIS OF LEFT TOE: ICD-10-CM

## 2021-05-05 LAB
ANION GAP SERPL CALCULATED.3IONS-SCNC: 11 MMOL/L (ref 5–18)
BASOPHILS # BLD AUTO: 0.1 THOU/UL (ref 0–0.2)
BASOPHILS NFR BLD AUTO: 1 % (ref 0–2)
BUN SERPL-MCNC: 21 MG/DL (ref 8–28)
C REACTIVE PROTEIN LHE: 0.5 MG/DL (ref 0–0.8)
CALCIUM SERPL-MCNC: 9.2 MG/DL (ref 8.5–10.5)
CHLORIDE BLD-SCNC: 104 MMOL/L (ref 98–107)
CO2 SERPL-SCNC: 23 MMOL/L (ref 22–31)
CREAT SERPL-MCNC: 0.99 MG/DL (ref 0.7–1.3)
EOSINOPHIL # BLD AUTO: 0.3 THOU/UL (ref 0–0.4)
EOSINOPHIL NFR BLD AUTO: 3 % (ref 0–6)
ERYTHROCYTE [DISTWIDTH] IN BLOOD BY AUTOMATED COUNT: 13.4 % (ref 11–14.5)
GFR SERPL CREATININE-BSD FRML MDRD: >60 ML/MIN/1.73M2
GLUCOSE BLD-MCNC: 189 MG/DL (ref 70–125)
HCT VFR BLD AUTO: 42.2 % (ref 40–54)
HGB BLD-MCNC: 14.2 G/DL (ref 14–18)
IMM GRANULOCYTES # BLD: 0 THOU/UL
IMM GRANULOCYTES NFR BLD: 0 %
LYMPHOCYTES # BLD AUTO: 2.4 THOU/UL (ref 0.8–4.4)
LYMPHOCYTES NFR BLD AUTO: 24 % (ref 20–40)
MCH RBC QN AUTO: 29.8 PG (ref 27–34)
MCHC RBC AUTO-ENTMCNC: 33.6 G/DL (ref 32–36)
MCV RBC AUTO: 89 FL (ref 80–100)
MONOCYTES # BLD AUTO: 0.9 THOU/UL (ref 0–0.9)
MONOCYTES NFR BLD AUTO: 9 % (ref 2–10)
NEUTROPHILS # BLD AUTO: 6.3 THOU/UL (ref 2–7.7)
NEUTROPHILS NFR BLD AUTO: 63 % (ref 50–70)
PLATELET # BLD AUTO: 264 THOU/UL (ref 140–440)
PMV BLD AUTO: 9.4 FL (ref 8.5–12.5)
POTASSIUM BLD-SCNC: 4.7 MMOL/L (ref 3.5–5)
RBC # BLD AUTO: 4.76 MILL/UL (ref 4.4–6.2)
SODIUM SERPL-SCNC: 138 MMOL/L (ref 136–145)
WBC: 10.1 THOU/UL (ref 4–11)

## 2021-05-05 PROCEDURE — 99203 OFFICE O/P NEW LOW 30 MIN: CPT | Performed by: FAMILY MEDICINE

## 2021-05-05 RX ORDER — ASPIRIN 325 MG
325 TABLET ORAL
COMMUNITY

## 2021-05-05 RX ORDER — AMLODIPINE BESYLATE 10 MG/1
TABLET ORAL
COMMUNITY
Start: 2021-03-11

## 2021-05-05 RX ORDER — LISINOPRIL 20 MG/1
TABLET ORAL
COMMUNITY
Start: 2021-03-11

## 2021-05-05 RX ORDER — GLIMEPIRIDE 4 MG/1
TABLET ORAL
COMMUNITY
Start: 2021-03-11

## 2021-05-05 ASSESSMENT — MIFFLIN-ST. JEOR: SCORE: 1628.5

## 2021-05-05 NOTE — PROGRESS NOTES
"Assessment & Plan     Left foot infection     Patient was referred to [facility] as this could be reoccurrence of infection  Unfortunately we don't have labs/imaging to make the definitive diagnosis here in the clinic. As [facility] ED is no longer in service he was referred to [facility].     Prosper Friedman MD   [place] [facility]    Subjective     Prosper is a 75 year old male who presents to clinic today for the following health issues:  Chief Complaint   Patient presents with     Urgent Care     Derm Problem     concern of infection in left foot. Had surgery on it 2 months ago for bone infection.      HPI    Blood sugars range around 120-125    No fevers.     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     Over last few days has had increasing discomfort and now swelling around incision sites and now 3rd toe.   Having pain especially in the ball of the foot.     There are no active problems to display for this patient.      Current Outpatient Medications   Medication     amLODIPine (NORVASC) 10 MG tablet     aspirin (ASA) 325 MG tablet     glimepiride (AMARYL) 4 MG tablet     lisinopril (ZESTRIL) 20 MG tablet     metFORMIN (GLUCOPHAGE) 500 MG tablet     No current facility-administered medications for this visit.          Objective    /56   Pulse 82   Temp 98.1  F (36.7  C) (Oral)   Resp 12   Ht 1.753 m (5' 9\")   Wt 89.8 kg (198 lb)   SpO2 100%   BMI 29.24 kg/m    Physical Exam   L Foot: amputated 1st/2nd toes there is no active drainage, suture sites are closed. Redness of the medial forefoot and 3rd toe  Gait: stable    No results found for any visits on 05/05/21.            The use of Dragon/Arnicaation services may have been used to construct the content in this note; any grammatical or spelling errors are non-intentional. Please contact the author of this note directly if you are in " need of any clarification.

## 2021-05-05 NOTE — PATIENT INSTRUCTIONS
[facility]    [address], [place] [address]

## 2021-05-12 ENCOUNTER — OFFICE VISIT - HEALTHEAST (OUTPATIENT)
Dept: VASCULAR SURGERY | Facility: CLINIC | Age: 76
End: 2021-05-12

## 2021-05-12 DIAGNOSIS — M20.42 HAMMER TOE OF LEFT FOOT: ICD-10-CM

## 2021-05-12 DIAGNOSIS — E11.42 DIABETIC PERIPHERAL NEUROPATHY ASSOCIATED WITH TYPE 2 DIABETES MELLITUS (H): ICD-10-CM

## 2021-05-24 ENCOUNTER — RECORDS - HEALTHEAST (OUTPATIENT)
Dept: ADMINISTRATIVE | Facility: CLINIC | Age: 76
End: 2021-05-24

## 2021-05-25 ENCOUNTER — RECORDS - HEALTHEAST (OUTPATIENT)
Dept: ADMINISTRATIVE | Facility: CLINIC | Age: 76
End: 2021-05-25

## 2021-05-26 ENCOUNTER — RECORDS - HEALTHEAST (OUTPATIENT)
Dept: ADMINISTRATIVE | Facility: CLINIC | Age: 76
End: 2021-05-26

## 2021-05-26 ENCOUNTER — RECORDS - HEALTHEAST (OUTPATIENT)
Dept: LAB | Facility: CLINIC | Age: 76
End: 2021-05-26

## 2021-05-26 VITALS
RESPIRATION RATE: 18 BRPM | DIASTOLIC BLOOD PRESSURE: 68 MMHG | HEART RATE: 80 BPM | TEMPERATURE: 97.9 F | SYSTOLIC BLOOD PRESSURE: 150 MMHG

## 2021-05-26 VITALS
SYSTOLIC BLOOD PRESSURE: 140 MMHG | DIASTOLIC BLOOD PRESSURE: 58 MMHG | TEMPERATURE: 98.1 F | HEART RATE: 78 BPM | RESPIRATION RATE: 18 BRPM

## 2021-05-26 LAB
ALBUMIN SERPL-MCNC: 3.9 G/DL (ref 3.5–5)
ALP SERPL-CCNC: 59 U/L (ref 45–120)
ALT SERPL W P-5'-P-CCNC: 33 U/L (ref 0–45)
ANION GAP SERPL CALCULATED.3IONS-SCNC: 14 MMOL/L (ref 5–18)
AST SERPL W P-5'-P-CCNC: 28 U/L (ref 0–40)
BASOPHILS # BLD AUTO: 0.1 THOU/UL (ref 0–0.2)
BASOPHILS NFR BLD AUTO: 1 % (ref 0–2)
BILIRUB SERPL-MCNC: 0.5 MG/DL (ref 0–1)
BUN SERPL-MCNC: 19 MG/DL (ref 8–28)
C REACTIVE PROTEIN LHE: 0.4 MG/DL (ref 0–0.8)
CALCIUM SERPL-MCNC: 9.2 MG/DL (ref 8.5–10.5)
CHLORIDE BLD-SCNC: 105 MMOL/L (ref 98–107)
CO2 SERPL-SCNC: 19 MMOL/L (ref 22–31)
CREAT SERPL-MCNC: 0.83 MG/DL (ref 0.7–1.3)
EOSINOPHIL # BLD AUTO: 0.3 THOU/UL (ref 0–0.4)
EOSINOPHIL NFR BLD AUTO: 4 % (ref 0–6)
ERYTHROCYTE [DISTWIDTH] IN BLOOD BY AUTOMATED COUNT: 12.8 % (ref 11–14.5)
GFR SERPL CREATININE-BSD FRML MDRD: >60 ML/MIN/1.73M2
GLUCOSE BLD-MCNC: 260 MG/DL (ref 70–125)
HBA1C MFR BLD: 6.6 %
HCT VFR BLD AUTO: 41.7 % (ref 40–54)
HGB BLD-MCNC: 13.7 G/DL (ref 14–18)
IMM GRANULOCYTES # BLD: 0 THOU/UL
IMM GRANULOCYTES NFR BLD: 0 %
LYMPHOCYTES # BLD AUTO: 1.8 THOU/UL (ref 0.8–4.4)
LYMPHOCYTES NFR BLD AUTO: 24 % (ref 20–40)
MCH RBC QN AUTO: 29.1 PG (ref 27–34)
MCHC RBC AUTO-ENTMCNC: 32.9 G/DL (ref 32–36)
MCV RBC AUTO: 89 FL (ref 80–100)
MONOCYTES # BLD AUTO: 0.6 THOU/UL (ref 0–0.9)
MONOCYTES NFR BLD AUTO: 8 % (ref 2–10)
NEUTROPHILS # BLD AUTO: 4.8 THOU/UL (ref 2–7.7)
NEUTROPHILS NFR BLD AUTO: 63 % (ref 50–70)
PLATELET # BLD AUTO: 264 THOU/UL (ref 140–440)
PMV BLD AUTO: 10.3 FL (ref 8.5–12.5)
POTASSIUM BLD-SCNC: 4.7 MMOL/L (ref 3.5–5)
PROT SERPL-MCNC: 7 G/DL (ref 6–8)
RBC # BLD AUTO: 4.7 MILL/UL (ref 4.4–6.2)
SODIUM SERPL-SCNC: 138 MMOL/L (ref 136–145)
WBC: 7.7 THOU/UL (ref 4–11)

## 2021-05-27 ENCOUNTER — RECORDS - HEALTHEAST (OUTPATIENT)
Dept: ADMINISTRATIVE | Facility: CLINIC | Age: 76
End: 2021-05-27

## 2021-05-27 VITALS — SYSTOLIC BLOOD PRESSURE: 120 MMHG | OXYGEN SATURATION: 95 % | DIASTOLIC BLOOD PRESSURE: 60 MMHG | HEART RATE: 72 BPM

## 2021-05-27 VITALS — RESPIRATION RATE: 16 BRPM | HEART RATE: 80 BPM | DIASTOLIC BLOOD PRESSURE: 60 MMHG | SYSTOLIC BLOOD PRESSURE: 120 MMHG

## 2021-05-27 VITALS — TEMPERATURE: 98.5 F | DIASTOLIC BLOOD PRESSURE: 68 MMHG | HEART RATE: 80 BPM | SYSTOLIC BLOOD PRESSURE: 132 MMHG

## 2021-05-27 VITALS
SYSTOLIC BLOOD PRESSURE: 120 MMHG | TEMPERATURE: 98.7 F | DIASTOLIC BLOOD PRESSURE: 68 MMHG | RESPIRATION RATE: 20 BRPM | HEART RATE: 60 BPM

## 2021-05-27 VITALS
DIASTOLIC BLOOD PRESSURE: 62 MMHG | HEART RATE: 62 BPM | RESPIRATION RATE: 20 BRPM | OXYGEN SATURATION: 98 % | SYSTOLIC BLOOD PRESSURE: 120 MMHG

## 2021-05-27 VITALS
RESPIRATION RATE: 20 BRPM | TEMPERATURE: 97.9 F | DIASTOLIC BLOOD PRESSURE: 62 MMHG | SYSTOLIC BLOOD PRESSURE: 144 MMHG | HEART RATE: 72 BPM

## 2021-05-27 VITALS
HEART RATE: 68 BPM | DIASTOLIC BLOOD PRESSURE: 70 MMHG | SYSTOLIC BLOOD PRESSURE: 140 MMHG | RESPIRATION RATE: 20 BRPM | TEMPERATURE: 97.9 F

## 2021-05-27 VITALS
SYSTOLIC BLOOD PRESSURE: 146 MMHG | DIASTOLIC BLOOD PRESSURE: 58 MMHG | TEMPERATURE: 97.2 F | HEART RATE: 72 BPM | RESPIRATION RATE: 18 BRPM

## 2021-05-27 VITALS — HEART RATE: 70 BPM | DIASTOLIC BLOOD PRESSURE: 62 MMHG | SYSTOLIC BLOOD PRESSURE: 150 MMHG | RESPIRATION RATE: 16 BRPM

## 2021-05-27 VITALS — WEIGHT: 198 LBS | BODY MASS INDEX: 29.24 KG/M2

## 2021-06-02 ENCOUNTER — RECORDS - HEALTHEAST (OUTPATIENT)
Dept: ADMINISTRATIVE | Facility: CLINIC | Age: 76
End: 2021-06-02

## 2021-06-04 VITALS
DIASTOLIC BLOOD PRESSURE: 58 MMHG | HEIGHT: 69 IN | TEMPERATURE: 98.5 F | RESPIRATION RATE: 16 BRPM | BODY MASS INDEX: 31.1 KG/M2 | WEIGHT: 210 LBS | HEART RATE: 72 BPM | SYSTOLIC BLOOD PRESSURE: 132 MMHG

## 2021-06-05 VITALS
BODY MASS INDEX: 29.24 KG/M2 | SYSTOLIC BLOOD PRESSURE: 130 MMHG | HEART RATE: 80 BPM | WEIGHT: 198 LBS | TEMPERATURE: 98.5 F | DIASTOLIC BLOOD PRESSURE: 62 MMHG

## 2021-06-05 VITALS
BODY MASS INDEX: 29.33 KG/M2 | TEMPERATURE: 98.1 F | HEIGHT: 69 IN | DIASTOLIC BLOOD PRESSURE: 58 MMHG | RESPIRATION RATE: 20 BRPM | HEART RATE: 76 BPM | WEIGHT: 198 LBS | SYSTOLIC BLOOD PRESSURE: 138 MMHG

## 2021-06-05 VITALS
HEIGHT: 69 IN | TEMPERATURE: 96.6 F | SYSTOLIC BLOOD PRESSURE: 128 MMHG | BODY MASS INDEX: 31.4 KG/M2 | HEART RATE: 76 BPM | RESPIRATION RATE: 20 BRPM | WEIGHT: 212 LBS | DIASTOLIC BLOOD PRESSURE: 68 MMHG

## 2021-06-05 VITALS
BODY MASS INDEX: 31.84 KG/M2 | HEIGHT: 69 IN | WEIGHT: 215 LBS | HEIGHT: 69 IN | WEIGHT: 215 LBS | BODY MASS INDEX: 31.84 KG/M2

## 2021-06-05 VITALS — BODY MASS INDEX: 31.84 KG/M2 | WEIGHT: 215 LBS | HEIGHT: 69 IN

## 2021-06-05 VITALS — BODY MASS INDEX: 29.24 KG/M2 | WEIGHT: 198 LBS

## 2021-06-10 NOTE — PROGRESS NOTES
FOOT AND ANKLE SURGERY/PODIATRY CONSULT NOTE        ASSESSMENT:   Diabetic Ulceration left foot  Ankle Instability       TREATMENT:  -The left foot ulceration is stable, no signs of infection. I discussed the principles of wound healing today including the importance of limited walking on the involved limb, good vascular perfusion, good glycemic control and the absence of infection.     -Referred for a CAM boot today, recommend limited walking.     -After discussion of risk factors and consent obtained 2% Lidocaine HCL jelly was applied, under clean conditions, the left and foot ulceration(s) were debrided using #15 blade scalpel.  Devitalized and nonviable tissue, along with any fibrin and slough, was removed to improve granulation tissue formation, stimulate wound healing, decrease overall bacteria load, disrupt biofilm formation and decrease edge senescence.  Total excisional debridement was 0.48 sq cm into the subcutaneous tissue with a depth of 0.2 cm.   Ulcers were improved afterwards and .  Measures were as noted on the flow sheet. Endoform with a gauze dressing applied today, he will re-apply as directed.    -He will follow-up with me in 3 weeks.    Jorge Adames DPM  [facility] [place]        HPI: Prosper Kirby was seen today at the request of Dr. Fuentes for a long standing ulceration on his left foot. The patient states he first noticed the sore about 8 months ago, and was treating the sore himself until one month ago when he saw a podiatrist. He walks in gym shoes. Last HgA1c at 7.4 on 8/7. PMH significant for DM2.       No past medical history on file.    Past Surgical History:   Procedure Laterality Date     APPENDECTOMY       CARPAL TUNNEL RELEASE Bilateral      SPINAL FUSION         No Known Allergies      Current Outpatient Medications:      amLODIPine (NORVASC) 10 MG tablet, Take 10 mg by mouth., Disp: , Rfl:      aspirin 325 MG tablet, Take 325 mg by mouth., Disp: , Rfl:       glimepiride (AMARYL) 4 MG tablet, Take 4 mg by mouth., Disp: , Rfl:      lisinopriL (PRINIVIL,ZESTRIL) 20 MG tablet, Take 20 mg by mouth., Disp: , Rfl:      metFORMIN (GLUCOPHAGE) 500 MG tablet, Take 500-1,000 mg by mouth., Disp: , Rfl:      omeprazole (PRILOSEC) 20 MG capsule, Take 20 mg by mouth., Disp: , Rfl:     Past Surgical History:   Procedure Laterality Date     APPENDECTOMY       CARPAL TUNNEL RELEASE Bilateral      SPINAL FUSION         Social History     Social History Narrative     Not on file       Family History   Problem Relation Age of Onset     Stroke Mother      Stroke Father      No Medical Problems Sister        Review of Systems - 10 point Review of Systems is negative except for left foot ulcer which is noted in HPI.      OBJECTIVE:  Appearance: alert, well appearing, and in no distress.    Vitals:    08/24/20 1412   BP: 132/58   Pulse: 72   Resp: 16   Temp: 98.5  F (36.9  C)       BMI= Body mass index is 31.01 kg/m .    General appearance: Patient is alert and fully cooperative with history & exam.  No sign of distress is noted during the visit.     Psychiatric: Affect is pleasant & appropriate.  Patient appears motivated to improve health.     Respiratory: Breathing is regular & unlabored while sitting.     HEENT: Hearing is intact to spoken word.  Speech is clear.  No gross evidence of visual impairment that would impact ambulation.        Vascular: Dorsalis pedis palpableBilateral.  Dermatologic:   VASC Wound 08/24/20 left plantar hallux (Active)   Pre Size Length 1.2 08/24/20 1400   Pre Size Width 0.4 08/24/20 1400   Pre Size Depth 0.4 08/24/20 1400   Pre Total Sq cm 0.48 08/24/20 1400   Granular base, does not probe to deep tissues. No erythema.   Neurologic: Diminished to light touch Bilateral.  Musculoskeletal: Contracted digits noted Bilateral. Limited ROM left ankle.     Imaging:     No results found.         Picture: None

## 2021-06-11 NOTE — PROGRESS NOTES
FOOT AND ANKLE SURGERY/PODIATRY Progress Note      ASSESSMENT:   Diabetic Ulceration left foot      TREATMENT:  -The left foot ulceration is measuring larger with undermining. I discussed with the patient that he is walking too much, which has caused the wound to increase in size. Recommend limited walking in the CAM boot at all times.    -After discussion of risk factors and consent obtained 2% Lidocaine HCL jelly was applied, under clean conditions, the left and foot ulceration(s) were debrided using #15 blade scalpel.  Devitalized and nonviable tissue, along with any fibrin and slough, was removed to improve granulation tissue formation, stimulate wound healing, decrease overall bacteria load, disrupt biofilm formation and decrease edge senescence.  Total excisional debridement was 1.12 sq cm into the subcutaneous tissue with a depth of 0.2 cm.   Ulcers were improved afterwards and .  Measures were as noted on the flow sheet. Patient tolerated this well. Endoform with a gauze dresssing was applied. He will continue to apply Endoform with a gauze dresssing as directed.    -He will follow-up in 3 weeks.    Jorge Adames DPM  [facility] [place]      HPI: Prosper Kirby was seen again today for a left foot ulceration. He has been using endoform but has not been using the CAM boot while at home.       No past medical history on file.    Past Surgical History:   Procedure Laterality Date     APPENDECTOMY       CARPAL TUNNEL RELEASE Bilateral      SPINAL FUSION         No Known Allergies      Current Outpatient Medications:      amLODIPine (NORVASC) 10 MG tablet, Take 10 mg by mouth., Disp: , Rfl:      aspirin 325 MG tablet, Take 325 mg by mouth., Disp: , Rfl:      glimepiride (AMARYL) 4 MG tablet, Take 4 mg by mouth., Disp: , Rfl:      lisinopriL (PRINIVIL,ZESTRIL) 20 MG tablet, Take 20 mg by mouth., Disp: , Rfl:      metFORMIN (GLUCOPHAGE) 500 MG tablet, Take 500-1,000 mg by mouth., Disp: ,  Rfl:      omeprazole (PRILOSEC) 20 MG capsule, Take 20 mg by mouth., Disp: , Rfl:     Review of Systems - 10 point Review of Systems is negative except for foot ulcer which is noted in HPI.      OBJECTIVE:  Vitals:    09/16/20 1350   BP: 140/58   Pulse: 78   Resp: 18   Temp: 98.1  F (36.7  C)     General appearance: Patient is alert and fully cooperative with history & exam.  No sign of distress is noted during the visit.   Vascular: Dorsalis pedis palpableLeft.  Dermatologic:    VASC Wound 08/24/20 left plantar hallux (Active)   Pre Size Length 0.8 09/16/20 1300   Pre Size Width 0.6 09/16/20 1300   Pre Size Depth 0.2 09/16/20 1300   Pre Total Sq cm 0.48 09/16/20 1300   Post Size Length 1.4 09/16/20 1400   Post Size Width 0.8 09/16/20 1400   Post Size Depth 0.2 09/16/20 1400   Post Total Sq cm 1.12 09/16/20 1400   Granular base with undermining. No erythema.   Neurologic: Diminished to light touch Left.  Musculoskeletal: Contracted digits noted Left.    Imaging:     No results found.       Picture: None

## 2021-06-12 NOTE — PROGRESS NOTES
FOOT AND ANKLE SURGERY/PODIATRY Progress Note      ASSESSMENT:   Diabetic Ulceration left foot      TREATMENT:  -The left foot ulceration has a granular base and is measuring smaller today. Stressed the importance of limited walking in the CAM boot. Patient declines use of a knee walker or a TCC. He will continue with endoform.     -After discussion of risk factors and consent obtained 2% Lidocaine HCL jelly was applied, under clean conditions, the left and foot ulceration(s) were debrided using #15 blade scalpel.  Devitalized and nonviable tissue, along with any fibrin and slough, was removed to improve granulation tissue formation, stimulate wound healing, decrease overall bacteria load, disrupt biofilm formation and decrease edge senescence.  Total excisional debridement was 0.48 sq cm into the subcutaneous tissue with a depth of 0.2 cm.   Ulcers were improved afterwards and .  Measures were as noted on the flow sheet. Patient tolerated this well. Endoform with a gauze dresssing was applied. He will continue to apply Endoform with a gauze dresssing as directed.    -He will follow-up in 3 weeks.    Jorge Adames DPM  [facility] [place]      HPI: Prosper Kirby was seen again today for a left foot ulceration. He has tried to remain limited walking in the CAM boot. Currently using endoform.       No past medical history on file.    Past Surgical History:   Procedure Laterality Date     APPENDECTOMY       CARPAL TUNNEL RELEASE Bilateral      SPINAL FUSION         No Known Allergies      Current Outpatient Medications:      amLODIPine (NORVASC) 10 MG tablet, Take 10 mg by mouth., Disp: , Rfl:      aspirin 325 MG tablet, Take 325 mg by mouth., Disp: , Rfl:      glimepiride (AMARYL) 4 MG tablet, Take 4 mg by mouth., Disp: , Rfl:      lisinopriL (PRINIVIL,ZESTRIL) 20 MG tablet, Take 20 mg by mouth., Disp: , Rfl:      metFORMIN (GLUCOPHAGE) 500 MG tablet, Take 500-1,000 mg by mouth., Disp: , Rfl:       omeprazole (PRILOSEC) 20 MG capsule, Take 20 mg by mouth., Disp: , Rfl:     Review of Systems - 10 point Review of Systems is negative except for foot ulcer which is noted in HPI.      OBJECTIVE:  Vitals:    10/07/20 1328   BP: 150/68   Pulse: 80   Resp: 18   Temp: 97.9  F (36.6  C)     General appearance: Patient is alert and fully cooperative with history & exam.  No sign of distress is noted during the visit.   Vascular: Dorsalis pedis palpableLeft.  Dermatologic:    VASC Wound 08/24/20 left plantar hallux (Active)   Pre Size Length 0.8 09/16/20 1300   Pre Size Width 0.6 09/16/20 1300   Pre Size Depth 0.2 09/16/20 1300   Pre Total Sq cm 0.48 09/16/20 1300   Post Size Length 0.8 10/07/20 1300   Post Size Width 0.5 10/07/20 1300   Post Size Depth 0.2 10/07/20 1300   Post Total Sq cm 0.4 10/07/20 1300   Granular base, minimal undermining. No erythema.   Neurologic: Diminished to light touch Left.  Musculoskeletal: Contracted digits noted Left.    Imaging:     No results found.       Picture: None

## 2021-06-12 NOTE — PROGRESS NOTES
MRI negative for osteomyelitis and abscess. Recommend we proceed with wound vac. Recommend non-weight bearing. He will follow-up with me in 2-3 weeks.

## 2021-06-13 NOTE — TELEPHONE ENCOUNTER
Pt called wondering is he needs change his dressing to his L foot. Writer informed him to keep intact until his f/u on 12/10.

## 2021-06-13 NOTE — TELEPHONE ENCOUNTER
Surgery Scheduled      Surgery/Procedure: Incision and drainage left foot with application of theraskin     Special Equipment: theraskin     Location: [facility]    Date: 12/03/2020    Time: 8:45am     Surgeon: Dr. Adames    OR Confirmed/ :  Yes with joya  on 11/18/2020    Orders In:  Yes    Entered on [email or web address] / [employer] Calendar:  Yes    Post Op: 12/12 & 12/24 MW    Covid Scheduled: 11/30/2020  Lab @     Blood Thinners Addressed: no

## 2021-06-13 NOTE — ANESTHESIA PROCEDURE NOTES
Peripheral Block    Patient location during procedure: pre-op  Start time: 12/3/2020 7:42 AM  End time: 12/3/2020 7:47 AM  post-op analgesia per surgeon order as noted in medical record  Staffing:  Performing  Anesthesiologist: Rosanna Mancera MD  Preanesthetic Checklist  Completed: patient identified, site marked, risks, benefits, and alternatives discussed, timeout performed, consent obtained, airway assessed, oxygen available, suction available, emergency drugs available and hand hygiene performed  Peripheral Block  Block type: sciatic, popliteal  Prep: ChloraPrep  Patient position: supine  Patient monitoring: cardiac monitor, continuous pulse oximetry, heart rate and blood pressure  Laterality: left  Injection technique: ultrasound guided    Ultrasound used to visualize needle placement in proximity to nerve being blocked: yes   US used to visualize anesthetic spread  Visualized anatomic structures normal  No Pathological Findings  Permanent ultrasound image captured for medical record  Sterile gel and probe cover used for ultrasound.  Needle  Needle type: Stimuplex   Needle gauge: 21 G  Needle length: 4 in  no peripheral nerve catheter placed  Assessment  Injection assessment: no difficulty with injection, negative aspiration for heme, no paresthesia on injection and incremental injection

## 2021-06-13 NOTE — PATIENT INSTRUCTIONS - HE
Non-weightbearing on left foot with CAM boot for stability.    Get crutches and knee roller walker to help you remain non-weightbearing. Go to [address] to get them.    Hold wound vac until after graft application.    Pack left foot ulcer with gauze, change daily.          GET COVID TEST PRIOR TO SURGERY.    Surgery Date December 3, 2020    Surgery Time ARRIVAL 6:45 AM Arrival for an 8:45 AM surgery    ( Arrive at the hospital two hours prior to your surgery and 1 hour prior at the surgery center. Check in at the . The only exception is if you are scheduled for surgery at 7am, you should arrive at 5:30am)    IF YOU NEED TO RESCHEDULE OR CANCEL YOUR SURGERY FOR ANY REASON PLEASE CALL THE CLINIC AS SOON AS POSSIBLE -[phone].    Admission Type: Outpatient    Surgeon: Dr. Adames    Surgery Procedure: Incision and drainage with Theraskin graft application    Surgery Location: [facility],[address] [place] [address] Admitting          Additional Information: If you use a CPAP machine for sleep apnea please bring it with you for surgery. We will want to monitor your breathing using your normal equipment.     HOSPITAL AND SURGERY CENTER INFORMATION    We need to know a lot of information about you before surgery.     1-5 Days Before:     A nurse will call you for a pre-screening interview. The phone call with the nurse will be much faster and easier if you  Have organized your information prior to the call.     Please have the following information available:    Preoperative Exam completed and faxed to our office    Primary care provider's and any specialty providers' contact information available. .    If requested by your primary care provider, have any heart and lung exams at least 3 days before surgery.    Have a complete and accurate list of medications available.     Have a list of your allergies/sensitivities and reactions    Know your health history, surgical history, and medical problems    Know any  anesthesia issues with you or within your family.     BE SURE TO NOTIFY US IF YOU ARE TAKING ANY BLOOD THINNING AGENTS: Coumadin, Plavix, Aspirin, Xarelto, Eliquis    Someone planned to bring you and stay with you after the surgery    Day Before Surgery    1. STOP Smoking and Drinking: It is important to stop smoking and drinking at least 24 hours before surgery. Smoking and drinking may cause complications in your recovery from anesthesia and may lengthen the healing process.    2. Pack for your hospital stay: If it will be required for you to stay at the hospital after surgery, bring personal items such as a robe, slippers, pajamas, additional clothing and toiletries. Don't forget:    List of medication    Eyeglass case or contact case with solution. You cannot wear contacts during surgery    Copies of your physical exam , lab results and EKG    Copy of Health Care Directives, Living Will or Power of     Insurance Cards    Photo ID    CPAP machine    3. NOTHING BY MOUTH 8 HOURS BEFORE. This includes gum, hard candy, water and mints. The only exception is if you have been instructed by your doctor to take your medications with sips of water. You may rinse your mouth or brush your teeth, but do not swallow water.     4. Remove Nail Polish  Day of Surgery    1. Medications- Take as indicated with sips of water     2. Wear comfortable loose fitting clothes. Wear your glasses-Not contacts. Do not wear jewelry and remove body piercing's. Surgery may be cancelled if they are not removed.     3. If same day surgery-Have a someone come with you to surgery that can help you understand the surgeon's instructions, drive you home and stay with you overnight the first night.     4. A nurse will call you at home within 72 hours following surgery to see how you are doing. Our nurses and doctors will discuss recovery with you.        The surgery scheduler Jennifer Stacy at [phone] will contact you to schedule if you  were not scheduled today.     You will need a preop physical within 30 days before surgery with your primary care provider. Please note if you do not get a complete History and Physical your surgery will be cancelled.   Please contact your primary to schedule.     A nurse should contact you from the hospital 1-2 days before surgery to review with you.    If you would like a Good Brittany Estimate for your upcoming service/procedure contact Cost of Care Estimates at [phone], advocates are available Monday through Friday 8am - 5pm. You may also submit a request online at [email or web address]    [facility]  [address]  [place]  [address]     [phone] for facility charge    Anesthesiology charge  [phone]          Please don't hesitate to call us with any additional question or problems  Our number is [phone]     Instructions for Patients Scheduled for Vascular or Podiatry Procedures During the COVID 19 Pandemic    Your Provider has determined that your condition warrants going ahead with your procedure at this time.  You will need to be tested for COVID19 within 72 hours of your procedure. We highly encourage patients to get tested for COVID-19 at one of our designated [facility] testing sites. We process the tests in our lab, which allows us to get the results quickly. If you choose to get tested at a non-[facility] location, you will need to contact your primary care provider to make those arrangements and ensure the results are available to your surgeon before you are arriving for your procedure. If we do not receive the results in time, your procedure may be postponed or canceled.  Please make sure your test is collected 3-days prior to your procedure date.  The results will need to get faxed to [phone].  After testing, you will need to remain in self-quarantine until your procedure.  If you are  notified of a positive COVID-19 result; you will need to call your provider for further recommendations.    Please call [phone] and press option 2 to speak to a nurse.  If the test is positive; DO NOT PRESENT FOR YOUR PROCEDURE until you have been given further instructions.  You will not be called with negative results so arrive as instructed unless otherwise notified.  Don't:    Don't invite visitors or friends into your home.    Don't leave your home unless absolutely necessary.    Don't share utensils and other household items with others in the home.  Do:    Wash your hands regularly with soap and water and use hand  with at least 60% alcohol if you don't have easy access to soap and water.     Disinfect surface areas daily, including doorknobs, electronics - especially phones, laptops and other devices.     Wash utensils and other items thoroughly.     Separate yourself from others in the household as best you can, including pets.    Keep your hands away from your face.     Practice all other prevention tips the CDC recommends, including covering your coughs and sneezes with a tissue or your sleeve and immediately throwing the tissue into the trash and washing your hands.    Call your hospital if you develop the following signs before your surgery:    Fever.    Cough.    Shortness of breath.    Sore throat.    Runny or stuffy nose.    Muscle or body aches.    Headaches.    Fatigue.    Vomiting and diarrhea.    These steps will help keep you & your family, other patients, and hospital staff safe from COVID19.

## 2021-06-13 NOTE — PATIENT INSTRUCTIONS - HE
NON-WEIGHT BEARING STATUS: NON-WEIGHT BEARING LEFT FOOT      1. 2x weekly and as needed cleanse the wound NS    2. Pat dry    3. Apply Cavilon no sting barrier wipe to the skin surrounding the wound to protect from drainage/maceration    4. Apply stoma adhesive paste to charlene wound    5. Cut strip of drape and apply to skin wear the bridging will occur; plan this area in advance; should not be over bony prominence    6. Cut the foam to fit the size of the wound    7. Apply to wound    8. Cut narrow strip of foam for bridging     9. Cover with drape to obtain air tight seal    10. Cut opening the size of a quarter for where the suction pad will be applied    11. Apply Suction pad    12. 100mmHG suction continuous          If you do not have a back up plan in place: If the negative pressure wound therapy malfunctions or unable to maintain seal: dressing must be removed and reapplied within 2 hours of the incident. If unable to reapply negative pressure wound dressing, place Normal Saline moistened gauze in wound bed and cover with appropriate dressing to keep wound bed moist.  Change wet-to-dry dressing two times a day until healthcare staff can re-implement negative pressure therapy.   Change canister at least weekly.  [facility] Contact Center can be reached at [phone], 24 hours a day 7 days a week    What is V.A.C.  Therapy?   V.A.C.  Therapy is a medical device system that   promotes wound healing by delivering negative   pressure (a vacuum) to the wound through a   patented dressing and therapy unit creating an   environment that promotes the wound healing   process. This negative pressure helps draw wound   edges together, remove wound fluids and infectious   materials and promote granulation tissue formation   (the connective tissue in healing wounds).   Unlike gauze bandages that merely cover a wound,   V.A.C.  Therapy actively works to help the wound   healing process.   The V.A.C.  Therapy System helps:      Promote wound healing     Provide a moist wound healing environment     Draw wound edges together     Remove fluid and infectious materials     Reduce wound odor     Reduce the need for daily dressing changes   When should I call my clinician when on   V.A.C.  Therapy?   Immediately report to your clinician if you have any   of these symptoms:     Fever over 102      Diarrhea     Headache     Sore throat     Confusion     Sick to your stomach or throwing up     Dizziness or feel faint when you stand up     Redness around the wound     Skin itches or rash present     Wound is sore, red or swollen     Pus or bad smell from the wound     Area in or around wound feels very warm    Vacuum-Assisted Closure of a Wound  Vacuum-assisted closure (VAC) of a wound is a type of treatment to help wounds heal. It s also known as negative pressure wound therapy. During the treatment, a device lowers air pressure on the wound. This can help the wound heal more quickly.  Understanding the wound VAC system  A wound VAC system has several parts. A foam or gauze dressing is put directly on the wound. The dressing is changed every 24 to 72 hours. An adhesive film covers and seals the dressing and wound. A drainage tube leads from under the adhesive film and connects to a portable vacuum pump. This pump removes air pressure over the wound. It may do this constantly. Or it may do it in cycles. During the treatment, you ll need to carry the portable pump everywhere you go.  Why wound VAC is used  You might need this therapy for a recent traumatic wound. Or you may need it for a chronic wound. This is a wound that does not heal the way it should over time. This can happen with wounds in people who have diabetes. You may need a wound VAC if you ve had a recent skin graft. And you may need a wound VAC for a large wound. Large wounds can take a longer time to heal.  A wound vacuum system may help your wound heal more quickly by:    Draining  extra fluid from the wound    Reducing swelling    Reducing bacteria in the wound    Keeping your wound moist and warm    Helping draw together wound edges    Increasing blood flow to your wound    Decreasing inflammation  Wound VAC offers some other advantages over other types of wound care. It may decrease your overall discomfort. The dressings usually need to be changed less often. And they may be easier to keep in position.  Risks of wound VAC  Wound VAC has some rare risks, such as:    Bleeding (which may be severe)    Wound infection    An abnormal connection between the intestinal tract and the skin (enteric fistula)  Proper training in dressing changes can help reduce the risk for these complications. Also, your doctor will carefully evaluate you to make sure you are a good candidate for the therapy. Certain problems can increase your risk for complications. These include:    Exposed organs or blood vessels    High risk of bleeding from another medical problem    Wound infection    Nearby bone infection    Dead wound tissue    Cancer tissue    Fragile skin, such as from aging or longtime use of topical steroids    Allergy to adhesive    Very poor blood flow to your wound    Wounds close to joints that may reopen because of movement  Your doctor will discuss the risks that apply to you. Make sure to talk with him or her about all of your questions and concerns.  Getting ready for wound VAC  You likely won t need to do much to get ready for wound VAC. In some cases, you may need to wait a while before having this therapy. For example, your doctor may first need to treat an infection in your wound. Dead or damaged tissue may also need to be removed from your wound.  You or a caregiver may need training on how to use the wound VAC device. This is done if you will be able to have your wound vacuum therapy at home. In other cases, you may need to have your wound vacuum therapy in a health care facility.  On the  day of your procedure  A health care provider will cover your wound with foam or gauze wound dressing. An adhesive film will be put over the dressing and wound. This seals the wound. The foam connects to a drainage tube, which leads to a vacuum pump. This pump is portable. When the pump is turned on, it draws fluid through the foam and out the drainage tubing. The pump may run constantly, or it may cycle off and on. Your exact setup will depend on the specific type of wound vacuum system that you use.  Managing your wound  You may need the dressing changed about once a day. You may need it changed more or less often, depending on your wound. You or your caregiver may be trained to do this at home. Or it may be done by a visiting health care provider. Your doctor may prescribe a pain medicine. This is to prevent or reduce pain during the dressing change.  You will likely need to use the wound VAC system for several weeks or months. During this time, you ll carry the portable pump everywhere you go.  Nutrition for wound healing  During this time, make sure you follow a healthy diet. This is needed so the wound can heal and to prevent infection. Your doctor can tell you more about what to include in your diet during this time.  follow up with your doctor if you have a medical condition that led to your wound, such as diabetes. He or she can help you prevent future wounds.  Follow-up care  Your doctor will carefully keep track of your healing. Make sure to keep all follow-up appointments.  When to call your health care provider  Call your health care provider right away if you have any of these:    Fever of 100.4 F (38.0 C) or higher    Increased redness, swelling, or warmth around wound    Increased pain    Bright red blood or blood clots in tubing or the collection chamber of the vacuum

## 2021-06-13 NOTE — ANESTHESIA CARE TRANSFER NOTE
Last vitals:   Vitals:    12/03/20 0927   BP: 154/67   Pulse: 69   Resp: 16   Temp: 36.4  C (97.6  F)   SpO2: 97%     Patient's level of consciousness is drowsy  Spontaneous respirations: yes  Maintains airway independently: yes  Dentition unchanged: yes  Oropharynx: oropharynx clear of all foreign objects    QCDR Measures:  ASA# 20 - Surgical Safety Checklist: WHO surgical safety checklist completed prior to induction    PQRS# 430 - Adult PONV Prevention: 4558F - Pt received => 2 anti-emetic agents (different classes) preop & intraop  ASA# 8 - Peds PONV Prevention: NA - Not pediatric patient, not GA or 2 or more risk factors NOT present  PQRS# 424 - Svetlana-op Temp Management: 4559F - At least one body temp DOCUMENTED => 35.5C or 95.9F within required timeframe  PQRS# 426 - PACU Transfer Protocol: - Transfer of care checklist used  ASA# 14 - Acute Post-op Pain: ASA14B - Patient did NOT experience pain >= 7 out of 10

## 2021-06-13 NOTE — ANESTHESIA POSTPROCEDURE EVALUATION
Patient: Prosper Kirby  Procedure(s):  INCISION AND DRAINAGE, Left foot with application of Theraskin (Left)  Anesthesia type: general    Patient location: Phase II Recovery  Last vitals:   Vitals Value Taken Time   /83 12/03/20 1028   Temp 36.6  C (97.8  F) 12/03/20 1028   Pulse 71 12/03/20 1028   Resp 16 12/03/20 1028   SpO2 97 % 12/03/20 1028     Post vital signs: stable  Level of consciousness: awake and responds to simple questions  Post-anesthesia pain: pain controlled  Post-anesthesia nausea and vomiting: no  Pulmonary: unassisted, return to baseline  Cardiovascular: stable and blood pressure at baseline  Hydration: adequate  Anesthetic events: no    QCDR Measures:  ASA# 11 - Svetlana-op Cardiac Arrest: ASA11B - Patient did NOT experience unanticipated cardiac arrest  ASA# 12 - Svetlana-op Mortality Rate: ASA12B - Patient did NOT die  ASA# 13 - PACU Re-Intubation Rate: NA - No ETT / LMA used for case  ASA# 10 - Composite Anes Safety: ASA10A - No serious adverse event    Additional Notes:

## 2021-06-13 NOTE — TELEPHONE ENCOUNTER
"[facility] nurse called with an update of pts left plantar foot wound. She stated that the periwound is red and macerated. There is no drainage coming from the wound vac. She stated that, \"it looks worse than before.     OK given to apply wet to moist gauze in the wound over the weekend. She will email a photo to the vascular1 email. Dr. Adames can review. HC will see the pt again on 11/16, need to update if wound vac should be reapplied. Pt has a f/u scheduled 11/18/20.  "

## 2021-06-13 NOTE — ANESTHESIA PROCEDURE NOTES
Peripheral Block    Patient location during procedure: pre-op  Start time: 12/3/2020 7:48 AM  End time: 12/3/2020 7:55 AM  post-op analgesia per surgeon order as noted in medical record  Staffing:  Performing  Anesthesiologist: Rosanna Mancera MD  Preanesthetic Checklist  Completed: patient identified, site marked, risks, benefits, and alternatives discussed, timeout performed, consent obtained, airway assessed, oxygen available, suction available, emergency drugs available and hand hygiene performed  Peripheral Block  Block type: saphenous, adductor canal block  Prep: ChloraPrep  Patient position: supine  Patient monitoring: cardiac monitor, continuous pulse oximetry, heart rate and blood pressure  Laterality: left  Injection technique: ultrasound guided    Ultrasound used to visualize needle placement in proximity to nerve being blocked: yes   US used to visualize anesthetic spread  Visualized anatomic structures normal  No Pathological Findings  Permanent ultrasound image captured for medical record  Sterile gel and probe cover used for ultrasound.  Needle  Needle type: Stimuplex   Needle gauge: 21 G  Needle length: 4 in  no peripheral nerve catheter placed  Assessment  Injection assessment: no difficulty with injection, negative aspiration for heme, no paresthesia on injection and incremental injection

## 2021-06-13 NOTE — PROGRESS NOTES
FOOT AND ANKLE SURGERY/PODIATRY Progress Note      ASSESSMENT:   Diabetic Ulceration left foot      TREATMENT:  -The wound vac was not applied after graft placement. I discussed the importance of the wound vac with graft incorporation. I will ask my staff to contact home healthcare for wound vac applied today at 100 mmHg, non-adherent applied with each vac application 2x per week.     -He will remain non-weight bearing and will follow-up with me in 2 weeks.    Jorge Adames DPM  [facility]      HPI: Prosper Kirby was seen again today for a left foot ulceration s/p application of Theraskin. He has not had the wound vac applied and has tried to remain non-weight bearing.      Past Medical History:   Diagnosis Date     Diabetes mellitus (H)      Hypertension        Past Surgical History:   Procedure Laterality Date     APPENDECTOMY       CARPAL TUNNEL RELEASE Bilateral      INCISION AND DRAINAGE OF WOUND Left 12/3/2020    Procedure: INCISION AND DRAINAGE, Left foot with application of Theraskin;  Surgeon: Jorge Adames DPM;  Location: [facility];  Service: Podiatry     SPINAL FUSION         No Known Allergies      Current Outpatient Medications:      amLODIPine (NORVASC) 10 MG tablet, Take 10 mg by mouth., Disp: , Rfl:      aspirin 325 MG tablet, Take 325 mg by mouth., Disp: , Rfl:      glimepiride (AMARYL) 4 MG tablet, Take 4 mg by mouth., Disp: , Rfl:      lisinopriL (PRINIVIL,ZESTRIL) 20 MG tablet, Take 20 mg by mouth., Disp: , Rfl:      metFORMIN (GLUCOPHAGE) 500 MG tablet, Take 500-1,000 mg by mouth., Disp: , Rfl:      omeprazole (PRILOSEC) 20 MG capsule, Take 20 mg by mouth., Disp: , Rfl:      oxyCODONE (ROXICODONE) 5 MG immediate release tablet, Take 1 tablet (5 mg total) by mouth every 6 (six) hours as needed for pain., Disp: 30 tablet, Rfl: 0    Review of Systems - 10 point Review of Systems is negative except for left foot ulcer which is noted in  "HPI.      OBJECTIVE:  Vitals:    12/10/20 1111   BP: 120/60   Pulse: 72   SpO2: 95%     General appearance: Patient is alert and fully cooperative with history & exam.  No sign of distress is noted during the visit.   Vascular: Dorsalis pedis palpableLeft.  Dermatologic:    VASC Wound 08/24/20 left plantar hallux (Active)   Pre Size Length 1.3 11/18/20 1300   Pre Size Width 0.2 11/18/20 1300   Pre Size Depth 2 11/18/20 1300   Pre Total Sq cm 0.26 11/18/20 1300   Post Size Length 1.3 11/18/20 1300   Post Size Width 0.7 11/18/20 1300   Post Size Depth 0.6 11/18/20 1300   Post Total Sq cm 0.91 11/18/20 1300   Undermined no 10/28/20 1300   Tunneling no 10/28/20 1300   Description pink,epibole 10/28/20 1300   Prodcut Used Gauze 10/28/20 1300       Incision 12/03/20 Surgical Foot Left (Active)   Theraskin graft intact plantar 1st MPJ left foot. No erythema left foot.   Neurologic: Diminished to light touch Left.  Musculoskeletal: Contracted digits noted Left.    Imaging:     Poc Us Guidance Needle Placement    Result Date: 12/3/2020  Ultrasound was performed as guidance to an anesthesia procedure.  Click \"PACS images\" hyperlink below to view any stored images.  For specific procedure details, view procedure note authored by anesthesia.         Picture: None    "

## 2021-06-13 NOTE — ANESTHESIA PREPROCEDURE EVALUATION
Anesthesia Evaluation      Patient summary reviewed   No history of anesthetic complications     Airway   Mallampati: II  Neck ROM: full   Pulmonary - normal exam    breath sounds clear to auscultation  (+) sleep apnea,   (-) shortness of breath, not a smoker                         Cardiovascular   Exercise tolerance: > or = 4 METS  (+) hypertension, ,     (-) angina  ECG reviewed  Rhythm: regular  Rate: normal,    murmur      Neuro/Psych    (+) neuromuscular disease,      Endo/Other    (+) diabetes mellitus type 2, obesity,      Comments: DM ulcer of left foot    GI/Hepatic/Renal    (+) GERD well controlled,        Other findings: Results for LIZ MACIAS (MRN [id number]) as of 12/3/2020 08:02    12/20/2018 12:29  Sodium: 138  Potassium: 4.5  Chloride: 102  CO2: 24  Anion Gap, Calculation: 12  BUN: 17  Creatinine: 0.84  GFR MDRD Af Amer: >60  GFR MDRD Non Af Amer: >60  Calcium: 9.7  AST: 66 (H)  ALT: 67 (H)  ALBUMIN: 3.7  Protein, Total: 6.8  Cholesterol: 166  Alkaline Phosphatase: 56  Bilirubin, Total: 0.7  Triglycerides: 343 (H)  HDL Cholesterol: 36 (L)  LDL Calculated: 61  Glucose: 218 (H)  CRP: 0.6  Hemoglobin A1c: 8.1 (H)        Dental                         Anesthesia Plan  Planned anesthetic: peripheral nerve block, general mask and total IV anesthesia    ASA 3   Induction: intravenous   Anesthetic plan and risks discussed with: patient  Anesthesia plan special considerations: antiemetics,   Post-op plan: other (WILLA orders)

## 2021-06-13 NOTE — TELEPHONE ENCOUNTER
Writer spoke with nurse Melara [phone] to have family apply and change a dry gauze daily until pt is seen next week. She will update the family.

## 2021-06-14 NOTE — TELEPHONE ENCOUNTER
Ashwini bib calling on behalf of Prosper.    Ashwini states that Prosper yesterday got a fever of 102, started to notice some swelling to left foot and ankle and skin is hot to touch.     Ashwini states that Prosper was supposed to have a wound vac change yesterday but home health never came, she called and they were to call her back and no one called. A dressing was placed instead of the wound vac by Prosper. Ashwini states an odor was present with removal of vac.    Ashwini will send picture of foot to [email or web address]

## 2021-06-14 NOTE — ANESTHESIA POSTPROCEDURE EVALUATION
Patient: Prosper Kirby  Procedure(s):  INCISION AND DRAINAGE, Left foot with excision of fibular sesamoid (Left)  Anesthesia type: general    Patient location: [facility] Floor  Last vitals:   Vitals Value Taken Time   /68 01/06/21 1729   Temp 36.9  C (98.4  F) 01/06/21 1729   Pulse 60 01/06/21 1729   Resp 16 01/06/21 1729   SpO2 97 % 01/06/21 1729     Post vital signs: stable  Level of consciousness: awake and responds to simple questions  Post-anesthesia pain: pain controlled  Post-anesthesia nausea and vomiting: no  Pulmonary: unassisted, return to baseline  Cardiovascular: stable and blood pressure at baseline  Hydration: adequate  Anesthetic events: no    QCDR Measures:  ASA# 11 - Svetlana-op Cardiac Arrest: ASA11B - Patient did NOT experience unanticipated cardiac arrest  ASA# 12 - Svetlana-op Mortality Rate: ASA12B - Patient did NOT die  ASA# 13 - PACU Re-Intubation Rate: ASA13B - Patient did NOT require a new airway mgmt  ASA# 10 - Composite Anes Safety: ASA10A - No serious adverse event    Additional Notes:

## 2021-06-14 NOTE — ANESTHESIA PREPROCEDURE EVALUATION
Anesthesia Evaluation      Patient summary reviewed   No history of anesthetic complications     Airway   Mallampati: II  Neck ROM: full   Pulmonary - negative ROS    breath sounds clear to auscultation  (-) not a smoker                         Cardiovascular   Exercise tolerance: < 4 METS  (+) hypertension, ,     (-) murmur  ECG reviewed (1/5/21: sinus tach w/ 1st degree AVB)  Rhythm: regular  Rate: normal,    no murmur      Neuro/Psych    (+) neuromuscular disease (peripheral neuropathy),      Endo/Other    (+) diabetes mellitus type 2 poorly controlled, obesity (BMI 31.74),      GI/Hepatic/Renal - negative ROS      Other findings: 1/6/21: WBC 12.2, Hgb 11.7, Plt 320, Na 136, K 3.9, BUN 11, Cr 0.75, INR 1.18, A1c 7.4      Dental    (+) caps                       Anesthesia Plan  Planned anesthetic: peripheral nerve block, general mask and total IV anesthesia  1/5/21: SARS-CoV-2 PCR Result    Negative     GA mask TIVA w/ propofol gtt.  Preop popliteal sciatic and adductor canal saphenous PNB.  Decadron (4 mg) and zofran for PONV ppx.    ASA 4   Induction: intravenous   Anesthetic plan and risks discussed with: patient  Anesthesia plan special considerations: antiemetics,   Post-op plan: routine recovery

## 2021-06-14 NOTE — TELEPHONE ENCOUNTER
"Ashwini called and left  stating Prosper is currently in ER, but wanting to discuss with Zacarias about \"hospice care\" due to Ashwini braking her hand and unable to care for Prosper at this time.Wanting a call back at [phone]  "

## 2021-06-14 NOTE — PATIENT INSTRUCTIONS - HE
Remain non-weight weightbearing.    Continue wound vac on left foot per instructions below:        - Wound Vac Instructions    1. 3x weekly and as needed cleanse the wound with NS    2. Pat dry    3. Apply Cavilon no sting barrier wipe to the skin surrounding the wound to protect from drainage/maceration    4. Apply drape to charlene wound. Cut strip of drape and apply to skin if bridging is needed; plan this area in advance; should not be over bony prominence     5. Cut the foam to fit the size of the wound    6. Apply foam to wound    7. Cut narrow strip of foam if bridging if needed    8. Cover foam with drape to obtain air tight seal    9. Cut opening the size of a quarter for where the suction pad will be applied    10. Apply Suction pad    11. 125mmHG suction continuous     [employer] can be reached at [phone], 24 hours a day 7 days a week     - If you do not have a back up plan in place:     If the negative pressure wound therapy malfunctions or unable to maintain seal: dressing must be removed and reapplied within 2 hours of the incident. If unable to reapply negative pressure wound dressing, place Normal Saline moistened gauze in wound bed and cover with appropriate dressing to keep wound bed moist.  Change wet-to-dry dressing two times a day until healthcare staff can re-implement negative pressure therapy. Change canister at least weekly.  [employer] can be reached at [phone], 24 hours a day 7 days a week    - Information on Vacuum-Assisted Closure of a Wound  Vacuum-assisted closure (VAC) of a wound is a type of treatment to help wounds heal. It s also known as negative pressure wound therapy. During the treatment, a device lowers air pressure on the wound. This can help the wound heal more quickly.  - Understanding the wound VAC system  A wound VAC system has several parts. A foam or gauze dressing is put directly on the wound. The dressing is changed every 24 to 72 hours. An  adhesive film covers and seals the dressing and wound. A drainage tube leads from under the adhesive film and connects to a portable vacuum pump. This pump removes air pressure over the wound. It may do this constantly. Or it may do it in cycles. During the treatment, you ll need to carry the portable pump everywhere you go.  - Why wound VAC is used  You might need this therapy for a recent traumatic wound. Or you may need it for a chronic wound. This is a wound that does not heal the way it should over time. This can happen with wounds in people who have diabetes. You may need a wound VAC if you ve had a recent skin graft. And you may need a wound VAC for a large wound. Large wounds can take a longer time to heal.  A wound vacuum system may help your wound heal more quickly by:    Draining extra fluid from the wound    Reducing swelling    Reducing bacteria in the wound    Keeping your wound moist and warm    Helping draw together wound edges    Increasing blood flow to your wound    Decreasing inflammation  Wound VAC offers some other advantages over other types of wound care. It may decrease your overall discomfort. The dressings usually need to be changed less often. And they may be easier to keep in position.  - Risks of wound VAC  Wound VAC has some rare risks, such as:    Bleeding (which may be severe)    Wound infection    An abnormal connection between the intestinal tract and the skin (enteric fistula)  Proper training in dressing changes can help reduce the risk for these complications. Also, your doctor will carefully evaluate you to make sure you are a good candidate for the therapy. Certain problems can increase your risk for complications. These include:    Exposed organs or blood vessels    High risk of bleeding from another medical problem    Wound infection    Nearby bone infection    Dead wound tissue    Cancer tissue    Fragile skin, such as from aging or longtime use of topical  steroids    Allergy to adhesive    Very poor blood flow to your wound    Wounds close to joints that may reopen because of movement  Your doctor will discuss the risks that apply to you. Make sure to talk with him or her about all of your questions and concerns.  - Getting ready for wound VAC  You likely won t need to do much to get ready for wound VAC. In some cases, you may need to wait a while before having this therapy. For example, your doctor may first need to treat an infection in your wound. Dead or damaged tissue may also need to be removed from your wound.  You or a caregiver may need training on how to use the wound VAC device. This is done if you will be able to have your wound vacuum therapy at home. In other cases, you may need to have your wound vacuum therapy in a health care facility.  - On the day of your procedure  A health care provider will cover your wound with foam or gauze wound dressing. An adhesive film will be put over the dressing and wound. This seals the wound. The foam connects to a drainage tube, which leads to a vacuum pump. This pump is portable. When the pump is turned on, it draws fluid through the foam and out the drainage tubing. The pump may run constantly, or it may cycle off and on. Your exact setup will depend on the specific type of wound vacuum system that you use.  - Managing your wound  You may need the dressing changed about once a day. You may need it changed more or less often, depending on your wound. You or your caregiver may be trained to do this at home. Or it may be done by a visiting health care provider. Your doctor may prescribe a pain medicine. This is to prevent or reduce pain during the dressing change.  You will likely need to use the wound VAC system for several weeks or months. During this time, you ll carry the portable pump everywhere you go.  - Nutrition for wound healing  During this time, make sure you follow a healthy diet. This is needed so the  wound can heal and to prevent infection. Your doctor can tell you more about what to include in your diet during this time.  follow up with your doctor if you have a medical condition that led to your wound, such as diabetes. He or she can help you prevent future wounds.  - Follow-up care  Your doctor will carefully keep track of your healing. Make sure to keep all follow-up appointments.  - When to call your health care provider  Call your health care provider right away if you have any of these:    Fever of 100.4 F (38.0 C) or higher    Increased redness, swelling, or warmth around wound    Increased pain    Bright red blood or blood clots in tubing or the collection chamber of the [facility] Virtual Check-In Number    [phone]    When you arrive for your IN OFFICE visit. Please call this number from the parking lot.      The staff will then virtually check you in and meet you at the door to escort you to a room.    If you arrive early and a room is not yet ready for you staff may have you wait can call you back when they are ready.     Please remember to wear a mask into your appointment     No Visitors Allowed    If you are having any symptoms- cough, fever, rash, loss of taste and smell or shortness of breath we ask that you please call and reschedule your appointment.

## 2021-06-14 NOTE — ANESTHESIA PREPROCEDURE EVALUATION
Anesthesia Evaluation      Patient summary reviewed   No history of anesthetic complications     Airway   Mallampati: II  Neck ROM: full   Pulmonary - negative ROS    breath sounds clear to auscultation  (-) not a smoker                         Cardiovascular   Exercise tolerance: < 4 METS  (+) hypertension, ,     (-) murmur  ECG reviewed (1/5/21: sinus tach w/ 1st degree AVB)  Rhythm: regular  Rate: normal,    no murmur      Neuro/Psych    (+) neuromuscular disease (peripheral neuropathy),      Endo/Other    (+) diabetes mellitus type 2 poorly controlled, obesity (BMI 31.74),      GI/Hepatic/Renal - negative ROS      Other findings: 1/6/21: WBC 12.2, Hgb 11.7, Plt 320, Na 136, K 3.9, BUN 11, Cr 0.75, INR 1.18, A1c 7.4      Dental    (+) caps                         Anesthesia Plan  Planned anesthetic: peripheral nerve block and MAC  1/5/21: SARS-CoV-2 PCR Result    Negative     ASA 4   Induction: intravenous   Anesthetic plan and risks discussed with: patient  Anesthesia plan special considerations: antiemetics,   Post-op plan: routine recovery

## 2021-06-14 NOTE — ANESTHESIA PROCEDURE NOTES
Peripheral Block    Patient location during procedure: pre-op  Start time: 1/8/2021 4:44 PM  End time: 1/8/2021 4:46 PM  surgical anesthesia  Staffing:  Performing  Anesthesiologist: Mini Briones MD  Preanesthetic Checklist  Completed: patient identified, site marked, risks, benefits, and alternatives discussed, timeout performed, consent obtained, airway assessed, oxygen available, suction available, emergency drugs available and hand hygiene performed  Peripheral Block  Block type: sciatic, popliteal  Prep: ChloraPrep  Patient position: supine  Patient monitoring: blood pressure, heart rate, continuous pulse oximetry and cardiac monitor  Laterality: left  Injection technique: ultrasound guided    Ultrasound used to visualize needle placement in proximity to nerve being blocked: yes   US used to visualize anesthetic spread  Visualized anatomic structures normal  No Pathological Findings  Permanent ultrasound image captured for medical record  Sterile gel and probe cover used for ultrasound.  Needle  Needle type: Stimuplex   Needle gauge: 20G  Needle length: 4 in  no peripheral nerve catheter placed  Assessment  Injection assessment: no difficulty with injection

## 2021-06-14 NOTE — ANESTHESIA PROCEDURE NOTES
Peripheral Block    Patient location during procedure: pre-op  Start time: 1/6/2021 3:48 PM  End time: 1/6/2021 3:42 PM  post-op analgesia per surgeon order as noted in medical record  Staffing:  Performing  Anesthesiologist: Matias Dubon MD  Preanesthetic Checklist  Completed: patient identified, site marked, risks, benefits, and alternatives discussed, timeout performed, consent obtained, airway assessed, oxygen available, suction available, emergency drugs available and hand hygiene performed  Peripheral Block  Block type: saphenous, adductor canal block  Prep: ChloraPrep  Patient position: supine  Patient monitoring: cardiac monitor, continuous pulse oximetry, blood pressure and heart rate  Laterality: left  Injection technique: ultrasound guided    Ultrasound used to visualize needle placement in proximity to nerve being blocked: yes   US used to visualize anesthetic spread  Visualized anatomic structures normal  No Pathological Findings  Permanent ultrasound image captured for medical record  Sterile gel and probe cover used for ultrasound.  Needle  Needle type: echogenic   Needle gauge: 20G  Needle length: 4 in  no peripheral nerve catheter placed  Assessment  Injection assessment: no difficulty with injection, negative aspiration for heme, no paresthesia on injection and incremental injection

## 2021-06-14 NOTE — TELEPHONE ENCOUNTER
Pt has recent surgery by Dr. Adames and was scheduled for staple removal today. Pt has COVID and unable to come to appt. Pt will be out if quantine on 2/5. Dr. Adames would rather remove the staples instead of his facility. F/U scheduled 2/9, wife updated. Left message with Axel nurse at pt facility to have his nurse call back to discuss plan and watch for signs and symptoms of infection.

## 2021-06-14 NOTE — ANESTHESIA PROCEDURE NOTES
Peripheral Block    Patient location during procedure: OR  Start time: 1/8/2021 4:42 PM  End time: 1/8/2021 4:44 PM  surgical anesthesia  Staffing:  Performing  Anesthesiologist: Mini Brionse MD  Preanesthetic Checklist  Completed: patient identified, site marked, risks, benefits, and alternatives discussed, timeout performed, consent obtained, airway assessed, oxygen available, suction available, emergency drugs available and hand hygiene performed  Peripheral Block  Block type: saphenous, adductor canal block  Prep: ChloraPrep  Patient position: supine  Patient monitoring: blood pressure, heart rate, continuous pulse oximetry and cardiac monitor  Laterality: left  Injection technique: ultrasound guided    Ultrasound used to visualize needle placement in proximity to nerve being blocked: yes   US used to visualize anesthetic spread  Visualized anatomic structures normal  No Pathological Findings  Permanent ultrasound image captured for medical record  Sterile gel and probe cover used for ultrasound.  Needle  Needle type: Stimuplex   Needle gauge: 20G  Needle length: 4 in  no peripheral nerve catheter placed  Assessment  Injection assessment: no difficulty with injection

## 2021-06-14 NOTE — PROGRESS NOTES
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 EVA drain removed today. Gauze dressing applied today which he will keep intact.     -Pathology report indicates osteomyelitis at the proximal margin of the 1st metatarsal. Defer to ID for antibiotic therapy. Continue non-weight bearing left foot.    -I would like to see him in 10 days for follow-up for removal of richy.     Jorge Adames DPM  [facility] Podiatry/Foot & Ankle Surgery      HPI: Prosper Kirby was seen again today s/p amputation 1st ray left foot. Doing well today. He has remained non-weight bearing on the left foot. Currently on antibiotics per ID.    Past Medical History:   Diagnosis Date     Diabetes mellitus (H)      Hypertension      Urinary retention        No Known Allergies      Current Outpatient Medications:      acetaminophen (TYLENOL) 500 MG tablet, Take 1,000 mg by mouth every 6 (six) hours as needed for pain., Disp: , Rfl:      amLODIPine (NORVASC) 10 MG tablet, Take 10 mg by mouth daily. , Disp: , Rfl:      aspirin 325 MG tablet, Take 325 mg by mouth daily. , Disp: , Rfl:      bacitracin 500 unit/gram Pack, Apply 1 packet topically once as needed (PICC site care - upon discontinuation of PICC)., Disp: 25 packet, Rfl: 0     ceFAZolin in D5W (ANCEF) 2 gram/100 mL PgBk, Infuse 100 mL (2 g total) into a venous catheter every 8 (eight) hours., Disp: 34347 mL, Rfl: 0     glimepiride (AMARYL) 4 MG tablet, Take 4 mg by mouth daily. , Disp: , Rfl:      lisinopriL (PRINIVIL,ZESTRIL) 20 MG tablet, Take 20 mg by mouth 2 (two) times a day. , Disp: , Rfl:      metFORMIN (GLUCOPHAGE) 1000 MG tablet, Take 1,000 mg by mouth 2 (two) times a day with meals., Disp: , Rfl:      oxyCODONE (ROXICODONE) 5 MG immediate release tablet, Take 1 tablet (5 mg total) by mouth every 4 (four) hours as needed., Disp: 13 tablet, Rfl: 0     polyethylene  glycol (MIRALAX) 17 gram packet, Take 1 packet (17 g total) by mouth daily as needed., Disp:  , Rfl: 0     HYDROcodone-acetaminophen 5-325 mg per tablet, TAKE 1-2 TABLETS BY MOUTH EVERY 4-6 HOURS AS NEEDED FOR PAIN, Disp: , Rfl:     Review of Systems - Negative       OBJECTIVE:  Appearance: alert, well appearing, and in no distress.    Vitals:    01/20/21 0818   BP: 140/70   Pulse: 68   Resp: 20   Temp: 97.9  F (36.6  C)       Surgical site on the Left foot has intact staples with skin edges well approximated, intact EVA drain. No erythema left foot. Neurovascular status unchanged left foot.     Imaging: None

## 2021-06-14 NOTE — TELEPHONE ENCOUNTER
Date: 2/11/2021 Status: [facility]   Time: 10:30 AM Length: 30   Visit Type: OFFICE VISIT [[id number]] Copay: $25.00   Provider: Qamar Boo MD       Provider OOO 02/03, and fully booked 01/28. Ok to leave as is?

## 2021-06-14 NOTE — PROGRESS NOTES
FOOT AND ANKLE SURGERY/PODIATRY Progress Note      ASSESSMENT:   Diabetic Ulceration left foot      TREATMENT:  -The wound vac has not been applied to the left foot since his last visit per orders. My office has contacted home healthcare to discuss why the wound vac has not been applied since surgery on 12/3/20 despite orders for application.      -I discussed with the patient that only a small portion of the Theraskin graft is intact, and may not incorporate without use of the wound vac, and risks include worsening of the wound and need for surgical I&D including amputation. Recommend he continue to remain non-weight bearing left foot.     -After discussion of risk factors and consent obtained 2% Lidocaine HCL jelly was applied, under clean conditions, the left and foot ulceration(s) were debrided using currette.  Devitalized and nonviable tissue, along with any fibrin and slough, was removed to improve granulation tissue formation, stimulate wound healing, decrease overall bacteria load, disrupt biofilm formation and decrease edge senescence.  Total excisional debridement was 5.75 sq cm into the muscle/fascia with a depth of 0.5 cm.   Ulcers were improved afterwards and .  Measures were as noted on the flow sheet. Patient tolerated this well. Wet to dry dressing applied today.    -He will follow-up with me in 2 weeks.    Jorge Adames DPM  [facility]      HPI: Prosper MARLENA Kirby was seen again today for a left foot ulceration. Since his last visit, he reports that the wound vac has not been applied. He has tried to remain non-weight bearing on the left foot.       Past Medical History:   Diagnosis Date     Diabetes mellitus (H)      Hypertension        Past Surgical History:   Procedure Laterality Date     APPENDECTOMY       CARPAL TUNNEL RELEASE Bilateral      INCISION AND DRAINAGE OF WOUND Left 12/3/2020    Procedure: INCISION AND DRAINAGE, Left foot with application of Theraskin;   Surgeon: Jorge Adames DPM;  Location: [facility];  Service: Podiatry     SPINAL FUSION         No Known Allergies      Current Outpatient Medications:      amLODIPine (NORVASC) 10 MG tablet, Take 10 mg by mouth., Disp: , Rfl:      aspirin 325 MG tablet, Take 325 mg by mouth., Disp: , Rfl:      glimepiride (AMARYL) 4 MG tablet, Take 4 mg by mouth., Disp: , Rfl:      lisinopriL (PRINIVIL,ZESTRIL) 20 MG tablet, Take 20 mg by mouth., Disp: , Rfl:      metFORMIN (GLUCOPHAGE) 500 MG tablet, Take 500-1,000 mg by mouth., Disp: , Rfl:      omeprazole (PRILOSEC) 20 MG capsule, Take 20 mg by mouth., Disp: , Rfl:      oxyCODONE (ROXICODONE) 5 MG immediate release tablet, Take 1 tablet (5 mg total) by mouth every 6 (six) hours as needed for pain., Disp: 30 tablet, Rfl: 0    Review of Systems - 10 point Review of Systems is negative except for left foot ulcer which is noted in HPI.      OBJECTIVE:  Vitals:    12/23/20 1102   BP: 120/68   Pulse: 60   Resp: 20   Temp: 98.7  F (37.1  C)     General appearance: Patient is alert and fully cooperative with history & exam.  No sign of distress is noted during the visit.   Vascular: Dorsalis pedis non-palpableLeft.  Dermatologic:    VASC Wound 08/24/20 left plantar hallux (Active)   Pre Size Length 2.5 12/23/20 1100   Pre Size Width 2.3 12/23/20 1100   Pre Size Depth 0.8 12/23/20 1100   Pre Total Sq cm 5.75 12/23/20 1100   Post Size Length 1.3 11/18/20 1300   Post Size Width 0.7 11/18/20 1300   Post Size Depth 0.6 11/18/20 1300   Post Total Sq cm 0.91 11/18/20 1300   Undermined no 10/28/20 1300   Tunneling no 10/28/20 1300   Description pink,epibole 10/28/20 1300   Prodcut Used Gauze 10/28/20 1300       Incision 12/03/20 Surgical Foot Left (Active)   Granular base left foot ulcer with minimal Theraskin graft, probes to deep tissues. No erythema left foot.   Neurologic: Diminished to light touch Left.  Musculoskeletal: Contracted digits noted Left.    Imaging:     Poc Us  "Guidance Needle Placement    Result Date: 12/3/2020  Ultrasound was performed as guidance to an anesthesia procedure.  Click \"PACS images\" hyperlink below to view any stored images.  For specific procedure details, view procedure note authored by anesthesia.         Picture: None    "

## 2021-06-14 NOTE — ANESTHESIA CARE TRANSFER NOTE
Last vitals:   Vitals:    01/06/21 1714   BP: 114/65   Pulse: 68   Resp: 17   Temp:    SpO2: 95%     Patient's level of consciousness is awake and drowsy  Spontaneous respirations: yes  Maintains airway independently: yes  Dentition unchanged: yes  Oropharynx: oropharynx clear of all foreign objects    QCDR Measures:  ASA# 20 - Surgical Safety Checklist: WHO surgical safety checklist completed prior to induction    PQRS# 430 - Adult PONV Prevention: NA - Not adult patient, not GA or 3 or more risk factors NOT present  ASA# 8 - Peds PONV Prevention: NA - Not pediatric patient, not GA or 2 or more risk factors NOT present  PQRS# 424 - Svetlana-op Temp Management: NA - MAC anesthesia or case < 60 minutes  PQRS# 426 - PACU Transfer Protocol: - Transfer of care checklist used  ASA# 14 - Acute Post-op Pain: ASA14B - Patient did NOT experience pain >= 7 out of 10

## 2021-06-14 NOTE — TELEPHONE ENCOUNTER
----- Message from Qamar Boo MD sent at 1/13/2021  3:36 PM CST -----  Patient is discharging from the hospital today.    Please arrange ID f/up appointment in 3 wks.    Thanks,  Qamar Boo.

## 2021-06-14 NOTE — TELEPHONE ENCOUNTER
I advised the pt they need to go to the ER. I told them I would cancel the appt if he was admitted.

## 2021-06-14 NOTE — ANESTHESIA CARE TRANSFER NOTE
Last vitals:   Vitals:    01/08/21 1921   BP: 121/60   Pulse: 69   Resp: 14   Temp: 36.5  C (97.7  F)   SpO2: 94%     Patient's level of consciousness is awake and drowsy  Spontaneous respirations: yes  Maintains airway independently: yes  Dentition unchanged: yes  Oropharynx: oropharynx clear of all foreign objects    QCDR Measures:  ASA# 20 - Surgical Safety Checklist: WHO surgical safety checklist completed prior to induction    PQRS# 430 - Adult PONV Prevention: NA - Not adult patient, not GA or 3 or more risk factors NOT present zofran and propofol given, no decadron due to diabetes  ASA# 8 - Peds PONV Prevention: NA - Not pediatric patient, not GA or 2 or more risk factors NOT present  PQRS# 424 - Svetlana-op Temp Management: 4559F - At least one body temp DOCUMENTED => 35.5C or 95.9F within required timeframe  PQRS# 426 - PACU Transfer Protocol: - Transfer of care checklist used  ASA# 14 - Acute Post-op Pain: ASA14B - Patient did NOT experience pain >= 7 out of 10     Report called to MELANI Fisher on P4, transported to floor by aid on room air, VSS.

## 2021-06-14 NOTE — PATIENT INSTRUCTIONS - HE
We placed a gauze dressing on today. This should stay in place until your next follow up appointment.     Remain non weight bearing.

## 2021-06-14 NOTE — ANESTHESIA PROCEDURE NOTES
Peripheral Block    Patient location during procedure: pre-op  Start time: 1/6/2021 3:45 PM  End time: 1/6/2021 3:48 PM  post-op analgesia per surgeon order as noted in medical record  Staffing:  Performing  Anesthesiologist: Matias Dubon MD  Preanesthetic Checklist  Completed: patient identified, site marked, risks, benefits, and alternatives discussed, timeout performed, consent obtained, airway assessed, oxygen available, suction available, emergency drugs available and hand hygiene performed  Peripheral Block  Block type: sciatic, popliteal  Prep: ChloraPrep  Patient position: supine  Patient monitoring: cardiac monitor, continuous pulse oximetry, blood pressure and heart rate  Laterality: left  Injection technique: ultrasound guided    Ultrasound used to visualize needle placement in proximity to nerve being blocked: yes   US used to visualize anesthetic spread  Visualized anatomic structures normal  No Pathological Findings  Permanent ultrasound image captured for medical record  Sterile gel and probe cover used for ultrasound.  Needle  Needle type: echogenic   Needle gauge: 20G  Needle length: 4 in  no peripheral nerve catheter placed  Assessment  Injection assessment: no difficulty with injection, negative aspiration for heme, no paresthesia on injection and incremental injection

## 2021-06-14 NOTE — ANESTHESIA POSTPROCEDURE EVALUATION
Patient: Prosper Kirby  Procedure(s):  INCISION AND DRAINAGE, (Left)  Left  left foot with amputation of the left hallux, and partial first metatarsal amputation (Left)  Anesthesia type: regional    Patient location: Phase II Recovery  Last vitals:   Vitals Value Taken Time   /73 01/08/21 2230   Temp 36.9  C (98.4  F) 01/08/21 2230   Pulse 78 01/08/21 2230   Resp 20 01/08/21 2230   SpO2 92 % 01/08/21 2250     Post vital signs: stable  Level of consciousness: awake and responds to simple questions  Post-anesthesia pain: pain controlled  Post-anesthesia nausea and vomiting: no  Pulmonary: unassisted  Cardiovascular: stable  Hydration: adequate  Anesthetic events: no    QCDR Measures:  ASA# 11 - Svetlana-op Cardiac Arrest: ASA11B - Patient did NOT experience unanticipated cardiac arrest  ASA# 12 - Svetlana-op Mortality Rate: ASA12B - Patient did NOT die  ASA# 13 - PACU Re-Intubation Rate: NA - No ETT / LMA used for case  ASA# 10 - Composite Anes Safety: ASA10A - No serious adverse event    Additional Notes:

## 2021-06-14 NOTE — TELEPHONE ENCOUNTER
Surgery Scheduled      Surgery/Procedure: I&D left foot with amputation of the left hallux and possible partial first metatarsal amputation     Special Equipment: TBD    Location: [facility]    Date: 01/08/2021    Time: 1500    Surgeon: Dr. Adames    OR Confirmed/ :  Yes with Ruby lincoln on 1/7    Orders In:  Yes    Entered on [email or web address] Calendar:  Yes    Post Op: TBD    Covid Scheduled: in house patient     Blood Thinners Addressed: No

## 2021-06-15 NOTE — PROGRESS NOTES
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 Staples removed today, steri-strips applied.     -I have referred him to [facility] for custom inserts with toe filler, shoes. Continue non-weight bearing left foot x2 weeks. Then ok to limit walking in the CAM boot until the custom inserts and shoes are available.     -He is discharged from my care at this time and encouraged to follow-up with me as concerns develop.     Jorge Adames, JAMEY  [facility]/Foot & Ankle Surgery      HPI: Prosper Kirby was seen again today s/p amputation 1st ray left foot. Doing well today. He has remained non-weight bearing on the left foot. Currently on antibiotics per ID.    Past Medical History:   Diagnosis Date     Diabetes mellitus (H)      Hypertension      Urinary retention        No Known Allergies      Current Outpatient Medications:      acetaminophen (TYLENOL) 500 MG tablet, Take 1,000 mg by mouth every 6 (six) hours as needed for pain., Disp: , Rfl:      amLODIPine (NORVASC) 10 MG tablet, Take 10 mg by mouth daily. , Disp: , Rfl:      aspirin 325 MG tablet, Take 325 mg by mouth daily. , Disp: , Rfl:      bacitracin 500 unit/gram Pack, Apply 1 packet topically once as needed (PICC site care - upon discontinuation of PICC)., Disp: 25 packet, Rfl: 0     ceFAZolin in D5W (ANCEF) 2 gram/100 mL PgBk, Infuse 100 mL (2 g total) into a venous catheter every 8 (eight) hours., Disp: 28945 mL, Rfl: 0     glimepiride (AMARYL) 4 MG tablet, Take 4 mg by mouth daily. , Disp: , Rfl:      HYDROcodone-acetaminophen 5-325 mg per tablet, TAKE 1-2 TABLETS BY MOUTH EVERY 4-6 HOURS AS NEEDED FOR PAIN, Disp: , Rfl:      lisinopriL (PRINIVIL,ZESTRIL) 20 MG tablet, Take 20 mg by mouth 2 (two) times a day. , Disp: , Rfl:      metFORMIN (GLUCOPHAGE) 1000 MG tablet, Take 1,000 mg by mouth 2 (two) times a day with meals., Disp: ,  Rfl:      oxyCODONE (ROXICODONE) 5 MG immediate release tablet, Take 1 tablet (5 mg total) by mouth every 4 (four) hours as needed., Disp: 13 tablet, Rfl: 0     polyethylene glycol (MIRALAX) 17 gram packet, Take 1 packet (17 g total) by mouth daily as needed., Disp:  , Rfl: 0    Review of Systems - Negative       OBJECTIVE:  Appearance: alert, well appearing, and in no distress.    Vitals:    02/11/21 1110   BP: 120/60   Pulse: 80   Resp: 16       Surgical site on the Left foot has intact staples with skin edges well coapted. No erythema left foot. Neurovascular status unchanged left foot.     Imaging: None

## 2021-06-15 NOTE — PROGRESS NOTES
[facility] INFECTIOUS DISEASE CLINIC [facility]   FOLLOW UP NOTE    Date: 2/11/2021  Patient Name: Prosper Kirby   YOB: 1945  MRN: [id number]      ASSESSMENT:  75-year-old man with a history of diabetes and hypertension who is admitted with worsening of the left foot infection.     1. Left foot infection.  Followed by is Dr. Adames, status post I&D on 12/3.  Nonhealing wound as an outpatient presenting with fevers and leukocytosis.  MRI showing osteomyelitis at the fibular sesamoid.  Underwent I&D on 1/6 with sesamoid excision.  Surgery was notable for purulence from the MPJ.  Pathology does show osteomyelitis of the sesamoids and other signs of necrosis.  Monosodium urate crystals were also seen on path.  Repeat I&D on 1/8 with left hallux amputation along with partial amputation of the distal metatarsal.  Pathology showing osteomyelitis at the metatarsal margin.  Preop wound cultures are positive for MSSA and group B strep.  Cultures from first I&D with MSSA, group B strep, and diphtheroids.  Cultures from most recent surgery are showing MSSA, coag negative staph, and diphtheroids. CRP still elevated as of 1/26. Most recent labs have not been forwarded to our clinic. Clinically, surgical site looks good.  Active issue      PLAN:  -Continue IV cefazolin until 2/16  -We will try to obtain recent labs from the patient's TCU  -PICC line can be removed once antibiotics are completed  -Follow-up with Dr. Adames in podiatry    Return to clinic as needed.    Qamar Boo MD  [facility]   Clinic phone: [phone]  Clinic fax: [phone]    ______________________________________________________________________    HISTORY OF PRESENT ILLNESS:   From inpatient ID consult on 1/9/2021:    Prosper Kirby is a 75 y.o. man with a history of diabetes, hypertension who is admitted with worsening left foot infection.  He presented with fevers and leukocytosis.  He was seen by podiatry and  underwent I&D on 1/6.  Osteomyelitis was seen on the sesamoid bones that were excised.  He returned to the OR on 1/8 for first toe amputation and distal metatarsal amputation.  He is currently on vancomycin and Zosyn.  He has no acute complaints.  He does not normally use oxygen, but is on 2 L of oxygen by nasal cannula.  He denies any cough or shortness of breath.      SUBJECTIVE / INTERVAL HISTORY:   Prosper Kirby returns for follow up.  Patient was hospitalized at [facility] from 1/5-1/14.  He was discharged on IV cefazolin for left foot osteomyelitis.  He is now staying at a TCU.  He is without acute complaints.  He is tolerating antibiotics.  No issues with his PICC line.  He is staying off his left foot, using his heel toe walk.  Otherwise, no issues with drainage from his incision site.      ROS:   No fevers, no rashes      Current Outpatient Medications:      acetaminophen (TYLENOL) 500 MG tablet, Take 1,000 mg by mouth every 6 (six) hours as needed for pain., Disp: , Rfl:      amLODIPine (NORVASC) 10 MG tablet, Take 10 mg by mouth daily. , Disp: , Rfl:      aspirin 325 MG tablet, Take 325 mg by mouth daily. , Disp: , Rfl:      bacitracin 500 unit/gram Pack, Apply 1 packet topically once as needed (PICC site care - upon discontinuation of PICC)., Disp: 25 packet, Rfl: 0     ceFAZolin in D5W (ANCEF) 2 gram/100 mL PgBk, Infuse 100 mL (2 g total) into a venous catheter every 8 (eight) hours., Disp: 17414 mL, Rfl: 0     glimepiride (AMARYL) 4 MG tablet, Take 4 mg by mouth daily. , Disp: , Rfl:      lisinopriL (PRINIVIL,ZESTRIL) 20 MG tablet, Take 20 mg by mouth 2 (two) times a day. , Disp: , Rfl:      metFORMIN (GLUCOPHAGE) 1000 MG tablet, Take 1,000 mg by mouth 2 (two) times a day with meals., Disp: , Rfl:      oxyCODONE (ROXICODONE) 5 MG immediate release tablet, Take 1 tablet (5 mg total) by mouth every 4 (four) hours as needed., Disp: 13 tablet, Rfl: 0     HYDROcodone-acetaminophen 5-325 mg per  tablet, TAKE 1-2 TABLETS BY MOUTH EVERY 4-6 HOURS AS NEEDED FOR PAIN, Disp: , Rfl:      polyethylene glycol (MIRALAX) 17 gram packet, Take 1 packet (17 g total) by mouth daily as needed., Disp:  , Rfl: 0      OBJECTIVE:  Vitals:    02/11/21 1011   BP: 130/62   Pulse: 80   Temp: 98.5  F (36.9  C)         GEN: No acute distress.    HENT: Head is normocephalic, atraumatic.   EYES: Eyes have anicteric sclerae without conjunctival injection  RESPIRATORY:  Normal breathing pattern.   EXTREMITIES: No edema. right upper extremity PICC line is in place without any surrounding erythema.  Left foot status post hallux amputation.  Staples in place, incision site looks good without drainage.  SKIN/HAIR/NAILS:  No rashes  NEUROLOGIC: Grossly nonfocal.       Pertinent labs:  Outside labs were reviewed.  1/26/2021: WBC 5.5, creatinine 0.67, CRP 3.4            Lab Results   Component Value Date    CRP 22.0 (H) 01/05/2021         Lab Results   Component Value Date    ALT 11 01/06/2021    AST 16 01/06/2021    ALKPHOS 50 01/06/2021    BILITOT 0.6 01/06/2021         MICROBIOLOGY DATA:  Reviewed    RADIOLOGY:  Reviewed

## 2021-06-15 NOTE — PROGRESS NOTES
S: Pt. seen in the [place] office. Male. Dr. Adames. RX: DM shoes and Custom DM Foot orthotics. DX: Osteomyelitis of ankle and foot. Diabetic foot infection.   O: Pt. has not had DM shoe and DMFO. Pt. has had DM for many years. Left great toe amputation in January 2021. No ulcerations at this time..   A: Pain 3/10. 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 No edema.. Pes Cavus feet. Left foot toes HDS. Today I C/M with bio foam for Custom [place] DMFO. FO reduce shear forces while ambulating. Lift and support Pes Cavus arches. I will not add a toe filler because there is not that much of the foot amp. to make a difference. Pt. choose Dr. Chicas 6310 size 9 W to accommodate DMFO.   P: Pt. to be seen for F/D. order #[id number]    Malik Soares CO,

## 2021-06-15 NOTE — TELEPHONE ENCOUNTER
Pt is scheduled for studies tomorrow and he was updated. Once done we will update the pt on the next steps.

## 2021-06-15 NOTE — TELEPHONE ENCOUNTER
Caller: Patient     Provider: MD Jorge Adames    Detailed reason for call: Patient requesting the orders for knee walker be extended for at least 2 more weeks. Using [employer]; current orders end today.    Best phone number to contact: [phone]    Best time to contact: Any time    Ok to leave a detailed message: Yes    Ok to speak to authorized person if needed: N/A      (Noted to patient if reason is related to wound or incision, to please send a photo via email or [employer].)

## 2021-06-15 NOTE — PATIENT INSTRUCTIONS - HE
Remain non-weightbearing on left foot for 2 more weeks, then okay for limited walking CAM boot until you receive your custom shoes and inserts.    Do not get wound wet for one more week, then okay to shower without covering your left foot.    Call to get shoes and inserts:  [facility] (Please call to make an appointment)    [facility]  [facility], [address]  [place], [place].  Phone: [phone]

## 2021-06-15 NOTE — PROGRESS NOTES
S: Pt. seen in the [place] office. Male. Dr. Adames. RX: DM shoes and Custom DM Foot orthotics. DX: Osteomyelitis of ankle and foot. Diabetic foot infection.   O: Pt's left foot has healed well. Pt. has not had DM shoe and DMFO. Pt. has had DM for many years. Left great toe amputation in January 2021. No ulcerations at this time..   A: Pain 3/10. 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 No edema.. Pes Cavus feet. Left foot toes HDS. Today I F/D Custom [place] DMFO. FO reduce shear forces while ambulating. Lift and support Pes Cavus arches. I did not add a toe filler because there is not that much of the foot amp. to make a difference. Pt. choose Dr. Chicas 6310 size 9 W to accommodate DMFO. Overall fit is good. Shoes where checked for proper fit while weight bearing in length, width and height. Left shoe had more room than right. Donning doffing wear and care along with beak in schedule given.  P: Pt. to be seen as needed.     Malik CORRAL,LO

## 2021-06-15 NOTE — TELEPHONE ENCOUNTER
Spoke to [facility], they faxed an extension request.  The request was signed and faxed back to extend rolling knee walker for 1 month.

## 2021-06-15 NOTE — TELEPHONE ENCOUNTER
Caller: Goldie with [facility]    Provider: MD Jorge Adames    Detailed reason for call: She saw patient today for home care assessment. She will begin seeing him next week. She would like get orders for his weight bear status, activity recommendations and any wound care orders. Verbal is fine.    Best phone number to contact: [phone]    Best time to contact: any    Ok to leave a detailed message: yes    Ok to speak to authorized person if needed: N/A      (Noted to patient if reason is related to wound or incision, to please send a photo via email or [employer].)

## 2021-06-16 NOTE — PATIENT INSTRUCTIONS - HE
Wear your custom shoes and inserts and gradually increase activity.    No showering for 1 more week, then ok to shower.  No soaking.    Steri-strips applied today, leave them on until they fall off on their own.

## 2021-06-16 NOTE — TELEPHONE ENCOUNTER
Caller: Nati with hospital financial services    Provider: MD Jorge Adames    Detailed reason for call: 04/01/21 surgery with Dr. Adames is scheduled as in-patient. Please confirm if this is correct? Insurance will require additional documentation supporting the need for in-patient and may not approve.    Best phone number to contact: [phone]    Best time to contact: any    Ok to leave a detailed message: yes

## 2021-06-16 NOTE — TELEPHONE ENCOUNTER
Left message for Nati updating her on why the surgery needed to be done in the hospital below. Told to call back with any questions.     Looks like I wanted to admit him to observation to assist with TCU placement post-op.

## 2021-06-16 NOTE — PROGRESS NOTES
Surgery Scheduled      Surgery/Procedure: toe amputation 2nd digit    Special Equipment: pulse lavage    Location: [facility]    Date: 4/1    Time: 11:30am    Surgeon: Dr. Adames    OR Confirmed/ :  Yes with Na on 3/22    Orders In:  Yes    Entered on [employer] / [employer] Calendar:  Yes    Post Op: 3/31    Covid Scheduled: 3/29/2021    Blood Thinners Addressed: No

## 2021-06-16 NOTE — TELEPHONE ENCOUNTER
Message left with pt to ensure he received all the surgical information and there are no questions.     Surgery Scheduled      Surgery/Procedure: Amputation second digit left foot    Special Equipment: pulse lavage    Location: [facility]    Date: 4/1    Time: 1130    Surgeon: Dr. Adames    OR Confirmed/ :  Yes tim Monzon on 3/22    Orders In:  Yes    Entered on "[employer] Calendar:  Yes    Post Op: 4/8    Covid Scheduled: yes [place]    Blood Thinners Addressed:  Yes

## 2021-06-16 NOTE — PROGRESS NOTES
Podiatry Progress Note        ASSESSMENT: S/P Amputation 2nd digit left foot       TREATMENT:  -Surgical site on the left is progressing well. Clean proximal margin per pathology report.     -Gauze dressing applied today which he will keep intact. Continue non-weight bearing on the left foot.     -He will follow-up with me in 2 weeks for suture removal.     Jorge Adames DPM  [facility] Podiatry/Foot & Ankle Surgery      HPI: Prosper Kirby was seen again today s/p amputation 2nd digit left foot. Doing well. He has remained non-weight bearing.     Past Medical History:   Diagnosis Date     Asthma      Cellulitis      Diabetes mellitus (H)      Hyperlipemia      Hypertension      Osteomyelitis (H)      Urinary retention        No Known Allergies      Current Outpatient Medications:      acetaminophen (TYLENOL) 500 MG tablet, Take 1,000 mg by mouth every 6 (six) hours as needed for pain., Disp: , Rfl:      amLODIPine (NORVASC) 10 MG tablet, Take 10 mg by mouth daily. , Disp: , Rfl:      aspirin 325 MG tablet, Take 325 mg by mouth daily. , Disp: , Rfl:      glimepiride (AMARYL) 4 MG tablet, Take 4 mg by mouth daily. , Disp: , Rfl:      lisinopriL (PRINIVIL,ZESTRIL) 40 MG tablet, Take 40 mg by mouth daily., Disp: , Rfl:      metFORMIN (GLUCOPHAGE) 1000 MG tablet, Take 1,000 mg by mouth 2 (two) times a day with meals. , Disp: , Rfl:      oxyCODONE (ROXICODONE) 5 MG immediate release tablet, Take 1 tablet (5 mg total) by mouth every 6 (six) hours as needed for pain., Disp: 30 tablet, Rfl: 0     polyethylene glycol (MIRALAX) 17 gram packet, Take 1 packet (17 g total) by mouth daily as needed., Disp:  , Rfl: 0    Current Facility-Administered Medications:      lidocaine 2 % jelly (XYLOCAINE), , Topical, PRN, Jorge Adames DPM, Given at 03/22/21 0850    Review of Systems - Negative       OBJECTIVE:  Appearance: alert, well appearing, and in no distress.    Vitals:    04/08/21 1122   BP: 144/62   Pulse: 72    Resp: 20   Temp: 97.9  F (36.6  C)       Surgical site on the amputated 2nd digit left foot has intact sutures with skin edges well approximated, no gapping noted. No erythema left foot. Neurovascular status unchanged left foot.

## 2021-06-16 NOTE — ANESTHESIA POSTPROCEDURE EVALUATION
Patient: Prosper Kirby  Procedure(s):  AMPUTATION, second digit left foot (Left)  Anesthesia type: regional    Patient location: Phase II Recovery  Last vitals:   Vitals Value Taken Time   /67 04/01/21 1245   Temp 37  C (98.6  F) 04/01/21 1200   Pulse 60 04/01/21 1246   Resp 12 04/01/21 1614   SpO2 99 % 04/01/21 1247   Vitals shown include unvalidated device data.  Post vital signs: stable  Level of consciousness: awake and responds to simple questions  Post-anesthesia pain: pain controlled  Post-anesthesia nausea and vomiting: no  Pulmonary: unassisted, return to baseline  Cardiovascular: stable and blood pressure at baseline  Hydration: adequate  Anesthetic events: no    QCDR Measures:  ASA# 11 - Svetlana-op Cardiac Arrest: ASA11B - Patient did NOT experience unanticipated cardiac arrest  ASA# 12 - Svetlana-op Mortality Rate: ASA12B - Patient did NOT die  ASA# 13 - PACU Re-Intubation Rate: NA - No ETT / LMA used for case  ASA# 10 - Composite Anes Safety: ASA10A - No serious adverse event    Additional Notes:

## 2021-06-16 NOTE — TELEPHONE ENCOUNTER
Nati is calling from [facility] insurance verification dept. She received an email from [facility] wondering why the patient is having surgery in the hospital verse a amplitory surgery clinic. She can be reached at [phone], ok to leave message VM confidential

## 2021-06-16 NOTE — PATIENT INSTRUCTIONS - HE
A gauze dressing was placed today. This can be changed daily    Surgery Information    Surgery Date: 4/1    Surgery Time: 11:30    COVID TEST : 3/29 at 11:30    PRE OP PHYSICAL( you will have to call your primary doctor to set this up)    ( Arrive at the hospital two hours prior to your surgery and 1 hour prior at the surgery center. Check in at the . The only exception is if you are scheduled for surgery at 7am, you should arrive at 5:30am)    IF YOU NEED TO RESCHEDULE OR CANCEL YOUR SURGERY FOR ANY REASON PLEASE CALL THE CLINIC AS SOON AS POSSIBLE -[phone].    Admission Type: Inpatient    Surgeon: Dr. Adames    Surgery Procedure:     Surgery [facility]: [facility]      If you take Blood Thinners Such as:  Warfarin, Xarelto, Plavix, Aspirin or Eliquis this will need to be HELD prior to procedure according to primary care provider/doctor or cardiology who prescribes this medication. Generally this is 5 days.    Additional Information: If you use a CPAP machine for sleep apnea please bring it with you for surgery. We will want to monitor your breathing using your normal equipment.     HOSPITAL AND SURGERY CENTER INFORMATION    We need to know a lot of information about you before surgery.     1-5 Days Before:     A nurse will call you for a pre-screening interview. The phone call with the nurse will be much faster and easier if you  Have organized your information prior to the call.     Please have the following information available:    Preoperative Exam completed and faxed to our office    Primary care provider's and any specialty providers' contact information available. .    If requested by your primary care provider, have any heart and lung exams at least 3 days before surgery.    Have a complete and accurate list of medications available.     Have a list of your allergies/sensitivities and reactions    Know your health history, surgical history, and medical problems    Know any anesthesia issues with  you or within your family.     BE SURE TO NOTIFY US IF YOU ARE TAKING ANY BLOOD THINNING AGENTS: Coumadin, Plavix, Aspirin, Xarelto, Eliquis    Someone planned to bring you and stay with you after the surgery    Day Before Surgery    1. STOP Smoking and Drinking: It is important to stop smoking and drinking at least 24 hours before surgery. Smoking and drinking may cause complications in your recovery from anesthesia and may lengthen the healing process.    2. Pack for your hospital stay: If it will be required for you to stay at the hospital after surgery, bring personal items such as a robe, slippers, pajamas, additional clothing and toiletries. Don't forget:    List of medication    Eyeglass case or contact case with solution. You cannot wear contacts during surgery    Copies of your physical exam , lab results and EKG    Copy of Health Care Directives, Living Will or Power of     Insurance Cards    Photo ID    CPAP machine    3. NOTHING BY MOUTH 8 HOURS BEFORE. This includes gum, hard candy, water and mints. The only exception is if you have been instructed by your doctor to take your medications with sips of water. You may rinse your mouth or brush your teeth, but do not swallow water.     4. Remove Nail Polish  Day of Surgery    1. Medications- Take as indicated with sips of water     2. Wear comfortable loose fitting clothes. Wear your glasses-Not contacts. Do not wear jewelry and remove body piercing's. Surgery may be cancelled if they are not removed.     3. If same day surgery-Have a someone come with you to surgery that can help you understand the surgeon's instructions, drive you home and stay with you overnight the first night.     4. A nurse will call you at home within 72 hours following surgery to see how you are doing. Our nurses and doctors will discuss recovery with you.

## 2021-06-16 NOTE — PATIENT INSTRUCTIONS - HE
Wound Instruction:    Left 2nd toe with diluted hibiclens then Medihoney alginate pack litely in wound then foam with silicon border.     Change 3 times a week.

## 2021-06-16 NOTE — TELEPHONE ENCOUNTER
Called Nati. Nati questioning weather surgery on 4/1 is inpatient or obs status? Insurance needs to know.     Informed Nati will talk with Dr. Adames once he is in clinic and call back

## 2021-06-16 NOTE — TELEPHONE ENCOUNTER
Dr. camarena spoke with Nati. States that Prosper will be admitted for obs status, Nati requesting surgery order be changed from inpatient.

## 2021-06-16 NOTE — ANESTHESIA CARE TRANSFER NOTE
Last vitals:   Vitals:    04/01/21 1010   BP: 144/65   Pulse: 79   Resp: 18   Temp: 36.6  C (97.8  F)   SpO2: 98%       Patient's level of consciousness is awake  Spontaneous respirations: yes  Maintains airway independently: yes  Dentition unchanged: yes  Oropharynx: oropharynx clear of all foreign objects    QCDR Measures:  ASA# 20 - Surgical Safety Checklist: WHO surgical safety checklist completed prior to induction    PQRS# 430 - Adult PONV Prevention: NA - Not adult patient, not GA or 3 or more risk factors NOT present  ASA# 8 - Peds PONV Prevention: 4558F-8P - Pt did NOT receive => 2 antiemetic agents  PQRS# 424 - Svetlana-op Temp Management: 4559F - At least one body temp DOCUMENTED => 35.5C or 95.9F within required timeframe  PQRS# 426 - PACU Transfer Protocol: - Transfer of care checklist used  ASA# 14 - Acute Post-op Pain: ASA14B - Patient did NOT experience pain >= 7 out of 10

## 2021-06-16 NOTE — ANESTHESIA PREPROCEDURE EVALUATION
Anesthesia Evaluation      Patient summary reviewed   No history of anesthetic complications     Airway   Mallampati: II  Neck ROM: full   Pulmonary - negative ROS    breath sounds clear to auscultation  (-) not a smoker                         Cardiovascular   Exercise tolerance: < 4 METS  (+) hypertension, ,     (-) murmur  ECG reviewed (1/5/21: sinus tach w/ 1st degree AVB)  Rhythm: regular  Rate: normal,    no murmur      Neuro/Psych    (+) neuromuscular disease (peripheral neuropathy),      Endo/Other    (+) diabetes mellitus type 2 poorly controlled, obesity (BMI 31.74),      GI/Hepatic/Renal - negative ROS      Other findings: 1/6/21: WBC 12.2, Hgb 11.7, Plt 320, Na 136, K 3.9, BUN 11, Cr 0.75, INR 1.18, A1c 7.4      Dental    (+) caps                         Anesthesia Plan  Planned anesthetic: MAC  1/5/21: SARS-CoV-2 PCR Result    Negative     ASA 3   Induction: intravenous   Anesthetic plan and risks discussed with: patient  Anesthesia plan special considerations: antiemetics,   Post-op plan: routine recovery

## 2021-06-17 NOTE — TELEPHONE ENCOUNTER
Telephone Encounter by Melinda Javed Aide at 3/15/2021  1:31 PM     Author: Melinda Javed Aide Service: -- Author Type: Aide    Filed: 3/15/2021  1:40 PM Encounter Date: 3/15/2021 Status: Signed    : eMlinda Javed Aide (Emily)       Richar from [facility] is calling, she is concerned about the left second toe, (dark, black with drainage, there is redness going up 50%)  It looks worst than last week when she saw him,  Patients wife is emailing photo.  Patient is two more days of anitbiotics left.

## 2021-06-17 NOTE — TELEPHONE ENCOUNTER
Telephone Encounter by Mini Cotton at 11/13/2020  3:03 PM     Author: Mini Cotton Service: -- Author Type: Patient Access    Filed: 11/13/2020  3:03 PM Encounter Date: 11/13/2020 Status: Signed    : Mini Cotton (Patient Access)

## 2021-06-17 NOTE — ED PROVIDER NOTES
EMERGENCY DEPARTMENT ENCOUNTER      NAME: Prosper Kirby  AGE: 75 y.o. male  YOB: 1945  MRN: [id number]  EVALUATION DATE & TIME: 2021  6:07 PM    ED PROVIDER: Kamran Magallanes M.D.    FINAL IMPRESSION:  1. Cellulitis of left toe    2. History of amputation of toe (H)        ED COURSE & MEDICAL DECISION MAKIN:16 PM Patient seen and examined.  Prior history and records reviewed. I saw the patient wearing surgical mask and gloves.  Differential diagnosis includes but limited to cellulitis, osteomyelitis, abscess.  Patient complains of redness and swelling of the third toe on the left foot for last couple of days, had amputation of the first and second toes on .  Incision is clean, dry, and intact, with no purulent drainage.  Mild redness and swelling of the third toe.  Mild swelling on the dorsum of the foot.  CRP is normal, white blood cell count is normal, most consistent with cellulitis, osteomyelitis less likely   6:37 PM I spoke to Dr Adames, podiatry.   Agrees with plan for antibiotics, recommends Augmentin or Bactrim and is patient is getting Zosyn in the emergency department, will be discharged with Augmentin.  He should follow-up with Dr. Barboza in 1 to 2 days.    At the conclusion of the encounter I discussed the results of all of the tests and the disposition. The questions were answered. The patient or family acknowledged understanding and was agreeable with the care plan.     PROCEDURES:   Procedures      MEDICATIONS GIVEN IN THE EMERGENCY:  Medications   piperacillin-tazobactam 3.375 g in NaCl 0.9 % 50 mL (MINI-BAG Plus) (ZOSYN) (3.375 g Intravenous New Bag 21 106)       NEW PRESCRIPTIONS STARTED AT TODAY'S ER VISIT  Current Discharge Medication List      START taking these medications    Details   amoxicillin-clavulanate (AUGMENTIN) 875-125 mg per tablet Take 1 tablet by mouth every 12 (twelve) hours for 7 days.  Qty: 14 tablet, Refills: 0    Associated Diagnoses:  Cellulitis of left toe         CONTINUE these medications which have NOT CHANGED    Details   acetaminophen (TYLENOL) 500 MG tablet Take 1,000 mg by mouth every 6 (six) hours as needed for pain.      amLODIPine (NORVASC) 10 MG tablet Take 10 mg by mouth daily.       aspirin 325 MG tablet Take 325 mg by mouth daily.       glimepiride (AMARYL) 4 MG tablet Take 4 mg by mouth daily.       lisinopriL (PRINIVIL,ZESTRIL) 40 MG tablet Take 40 mg by mouth daily.      metFORMIN (GLUCOPHAGE) 1000 MG tablet Take 1,000 mg by mouth 2 (two) times a day with meals.       oxyCODONE (ROXICODONE) 5 MG immediate release tablet Take 1 tablet (5 mg total) by mouth every 6 (six) hours as needed for pain.  Qty: 30 tablet, Refills: 0    Associated Diagnoses: Osteomyelitis of ankle and foot (H)      polyethylene glycol (MIRALAX) 17 gram packet Take 1 packet (17 g total) by mouth daily as needed.  Qty:  , Refills: 0    Associated Diagnoses: Drug-induced constipation              PCP: Alin Fuentes MD  =================================================================    Chief Complaint   Patient presents with     Foot Pain       HPI      Prosper Kirby is a 75 y.o. male who presents to the ED for evaluation of foot pain.     The patient recently underwent amputation of his left first and second toes for osteomyelitis with Dr Adames on 4/1 (~5 wks ago). The patient presents to the ED with pain, swelling, and redness to the left foot. He is concerned about a possible infection. He denies any other complaints at this time. Denies any known medication allergies.      REVIEW OF SYSTEMS   Review of Systems   Musculoskeletal:        Positive for left foot pain and swelling with amputation of the first and second toes.    Skin:        Positive for redness to left foot.       See HPI.  All other systems reviewed and negative.    PAST MEDICAL HISTORY:  Past Medical History:   Diagnosis Date     Asthma      Cellulitis      Diabetes mellitus (H)       Hyperlipemia      Hypertension      Osteomyelitis (H)      Urinary retention        PAST SURGICAL HISTORY:  Past Surgical History:   Procedure Laterality Date     APPENDECTOMY       BACK SURGERY       CARPAL TUNNEL RELEASE Bilateral      FOOT AMPUTATION Left 1/8/2021    Procedure: Left  left foot with amputation of the left hallux, and partial first metatarsal amputation;  Surgeon: Jorge Adames DPM;  [facility]  Service: Podiatry     INCISION AND DRAINAGE OF WOUND Left 12/3/2020    Procedure: INCISION AND DRAINAGE, Left foot with application of [place];  Surgeon: Jorge Adamse DPM;  Location: [facility] OR;  Service: Podiatry     INCISION AND DRAINAGE OF WOUND Left 1/6/2021    Procedure: INCISION AND DRAINAGE, Left foot with excision of fibular sesamoid;  Surgeon: Jorge Adames DPM;  Location: [facility] OR;  Service: Podiatry     INCISION AND DRAINAGE OF WOUND Left 1/8/2021    Procedure: INCISION AND DRAINAGE,;  Surgeon: Jorge Adames DPM;  Location: [facility] OR;  Service: Podiatry     PICC MIDLINE INSERTION  1/13/2021          SPINAL FUSION       TOE AMPUTATION Left 4/1/2021    Procedure: AMPUTATION, second digit left foot;  Surgeon: Jorge Adames DPM;  [facility];  Service: Podiatry       CURRENT MEDICATIONS:    Current Facility-Administered Medications on File Prior to Encounter   Medication     lidocaine 2 % jelly (XYLOCAINE)     Current Outpatient Medications on File Prior to Encounter   Medication Sig     acetaminophen (TYLENOL) 500 MG tablet Take 1,000 mg by mouth every 6 (six) hours as needed for pain.     amLODIPine (NORVASC) 10 MG tablet Take 10 mg by mouth daily.      aspirin 325 MG tablet Take 325 mg by mouth daily.      glimepiride (AMARYL) 4 MG tablet Take 4 mg by mouth daily.      lisinopriL (PRINIVIL,ZESTRIL) 40 MG tablet Take 40 mg by mouth daily.     metFORMIN (GLUCOPHAGE) 1000 MG tablet Take 1,000 mg by mouth 2  (two) times a day with meals.      oxyCODONE (ROXICODONE) 5 MG immediate release tablet Take 1 tablet (5 mg total) by mouth every 6 (six) hours as needed for pain.     polyethylene glycol (MIRALAX) 17 gram packet Take 1 packet (17 g total) by mouth daily as needed.       ALLERGIES:  No Known Allergies    FAMILY HISTORY:  Family History   Problem Relation Age of Onset     Stroke Mother      Stroke Father      No Medical Problems Sister        SOCIAL HISTORY:   Social History     Socioeconomic History     Marital status:      Spouse name: None     Number of children: None     Years of education: None     Highest education level: None   Occupational History     None   Social Needs     Financial resource strain: None     Food insecurity     Worry: None     Inability: None     Transportation needs     Medical: None     Non-medical: None   Tobacco Use     Smoking status: Never Smoker     Smokeless tobacco: Never Used   Substance and Sexual Activity     Alcohol use: Never     Frequency: Never     Drug use: Never     Sexual activity: None   Lifestyle     Physical activity     Days per week: None     Minutes per session: None     Stress: None   Relationships     Social connections     Talks on phone: None     Gets together: None     Attends [employer] service: None     Active member of club or organization: None     Attends meetings of clubs or organizations: None     Relationship status: None     Intimate partner violence     Fear of current or ex partner: None     Emotionally abused: None     Physically abused: None     Forced sexual activity: None   Other Topics Concern     None   Social History Narrative     None       VITALS:  Patient Vitals for the past 24 hrs:   BP Temp Temp src Pulse Resp SpO2 Weight   05/05/21 1722 168/73 98.1  F (36.7  C) Temporal 80 18 96 % 198 lb (89.8 kg)       PHYSICAL EXAM    Physical Exam   Constitutional: He is oriented to person, place, and time. He appears well-developed and  well-nourished. No distress.   HENT:   Head: Normocephalic and atraumatic.   Nose: Nose normal.   Eyes: Conjunctivae and EOM are normal.   Neck: Normal range of motion. Neck supple.   Pulmonary/Chest: Effort normal.   Musculoskeletal: Normal range of motion.      Comments: Incision of the toe amputation site clean, dry, and intact. Steri-strips removed. No purulent drainage. Third toe on left foot with swelling and redness. Mild swelling to dorsum of left foot without redness.     Neurological: He is alert and oriented to person, place, and time.   No focal neurologic deficits.  CN III-XII intact.   Skin: Skin is warm and dry.   Psychiatric: He has a normal mood and affect. His behavior is normal.   Nursing note and vitals reviewed.       LAB:  All pertinent labs reviewed and interpreted.  Results for orders placed or performed during the hospital encounter of 05/05/21   Basic Metabolic Panel   Result Value Ref Range    Sodium 138 136 - 145 mmol/L    Potassium 4.7 3.5 - 5.0 mmol/L    Chloride 104 98 - 107 mmol/L    CO2 23 22 - 31 mmol/L    Anion Gap, Calculation 11 5 - 18 mmol/L    Glucose 189 (H) 70 - 125 mg/dL    Calcium 9.2 8.5 - 10.5 mg/dL    BUN 21 8 - 28 mg/dL    Creatinine 0.99 0.70 - 1.30 mg/dL    GFR MDRD Af Amer >60 >60 mL/min/1.73m2    GFR MDRD Non Af Amer >60 >60 mL/min/1.73m2   C-Reactive Protein   Result Value Ref Range    CRP 0.5 0.0 - 0.8 mg/dL   HM1 (CBC with Diff)   Result Value Ref Range    WBC 10.1 4.0 - 11.0 thou/uL    RBC 4.76 4.40 - 6.20 mill/uL    Hemoglobin 14.2 14.0 - 18.0 g/dL    Hematocrit 42.2 40.0 - 54.0 %    MCV 89 80 - 100 fL    MCH 29.8 27.0 - 34.0 pg    MCHC 33.6 32.0 - 36.0 g/dL    RDW 13.4 11.0 - 14.5 %    Platelets 264 140 - 440 thou/uL    MPV 9.4 8.5 - 12.5 fL    Neutrophils % 63 50 - 70 %    Lymphocytes % 24 20 - 40 %    Monocytes % 9 2 - 10 %    Eosinophils % 3 0 - 6 %    Basophils % 1 0 - 2 %    Immature Granulocyte % 0 <=0 %    Neutrophils Absolute 6.3 2.0 - 7.7 thou/uL     Lymphocytes Absolute 2.4 0.8 - 4.4 thou/uL    Monocytes Absolute 0.9 0.0 - 0.9 thou/uL    Eosinophils Absolute 0.3 0.0 - 0.4 thou/uL    Basophils Absolute 0.1 0.0 - 0.2 thou/uL    Immature Granulocyte Absolute 0.0 <=0.0 thou/uL     I, Martine Vega, am serving as a scribe to document services personally performed by Dr. Kamran Magallanes based on my observation and the provider's statements to me. I, Kamran Magallanes MD attest that Martine Vega is acting in a scribe capacity, has observed my performance of the services and has documented them in accordance with my direction.    Kamran Magallanes M.D.  Emergency Medicine  [facility] EMERGENCY DEPARTMENT  [address]  [facility] [address]  Dept: [phone]  Loc: [phone]       Kamran Magallanes MD  05/05/21 7587

## 2021-06-17 NOTE — TELEPHONE ENCOUNTER
Call received from spouse, Ashwini  Consent to Communicate on chart.    Prosper was in [facility] today and thought that he was supposed to go to [facility] to get an x-ray. Went he presented to the hospital to have this done, he was told that there was no order.    Per Chart Review - Progress Notes from today's visit:  Assessment & Plan      Left foot infection      Patient was referred to [facility] as this could be reoccurrence of infection  Unfortunately we don't have labs/imaging to make the definitive diagnosis here in the clinic. As [facility] ED is no longer in service he was referred to [facility].      Prosper Friedman MD   [place] UNSCHEDULED CARE    4:29 pm - contacted [facility]. Spoke to Carmen.  She verified with Dr. Friedman, Pt is to be evaluated in [facility] ED    Notified spouse. She reports that they will proceed to [facility] ER    COVID 19 Nurse Triage Plan/Patient Instructions    Please be aware that novel coronavirus (COVID-19) may be circulating in the community. If you develop symptoms such as fever, cough, or SOB or if you have concerns about the presence of another infection including coronavirus (COVID-19), please contact your health care provider or visit  [email or web address].    Disposition/Instructions    ED Visit recommended. Follow protocol based instructions.      Bring Your Own Device:  Please also bring your smart device(s) (smart phones, tablets, laptops) and their charging cables for your personal use and to communicate with your care team during your visit.      Thank you for taking steps to prevent the spread of this virus.  o Limit your contact with others.  o Wear a simple mask to cover your cough.  o Wash your hands well and often.    Resources    [facility]: About COVID-19: [email or web address]    CDC: What to Do If You're Sick: www.cdc.gov/coronavirus/2019-ncov/about/steps-when-sick.html    CDC: Ending Home Isolation:  www.cdc.gov/coronavirus/2019-ncov/hcp/disposition-in-home-patients.html     CDC: Caring for Someone: www.cdc.gov/coronavirus/2019-ncov/if-you-are-sick/care-for-someone.html     [facility]: Interim Guidance for Hospital Discharge to Home: www.[email or web address]/diseases/coronavirus/hcp/hospdischarge.pdf    [facility] clinical trials (COVID-19 research studies): [email or web address].[facility]-clinical-trials     Below are the COVID-19 hotlines at the [place] Department of Health ([facility]). Interpreters are available.   o For health questions: Call 046-295-4502 or 1-665.211.2303 (7 a.m. to 7 p.m.)  o For questions about schools and childcare: Call 880-039-2314 or 1-725.612.5898 (7 a.m. to 7 p.m.)     Deborah Diaz RN  [facility] Nurse Advisor      Reason for Disposition    Information only question and nurse able to answer    Protocols used: INFORMATION ONLY CALL - NO TRIAGE-A-[place]       stretcher

## 2021-06-17 NOTE — TELEPHONE ENCOUNTER
Telephone Encounter by Delmy Pendleton CMA at 1/27/2021 10:27 AM     Author: Delmy Pendleton CMA Service: -- Author Type: Certified Medical Assistant    Filed: 1/27/2021 10:30 AM Encounter Date: 1/27/2021 Status: Signed    : Delmy Pendleton CMA (Certified Medical Assistant)       1/26/21 External lab unable to review in care everywhere. Faxed to scan  CRP  3.4  Creatinine  0.67  WBC   5.5

## 2021-06-17 NOTE — TELEPHONE ENCOUNTER
Telephone Encounter by Mini Cotton at 1/5/2021  9:04 AM     Author: Mini Cotton Service: -- Author Type: Patient Access    Filed: 1/5/2021  9:05 AM Encounter Date: 1/5/2021 Status: Signed    : Mini Cotton (Patient Access)       Dear Doctor Zacarias, Cleve got fever at 102 last night, and his left foot is hot, probably infected. He has appointment with you on Wednesday, but I am asking your opinion on how to care for his foot now.  Thanks     *Wife called back and would also like to discuss getting home care for patient for a while as she is currently nursing an injury to her hand after assisting patient with cares.

## 2021-06-17 NOTE — PATIENT INSTRUCTIONS - HE
[facility] will call you within the next 2 business day to schedule your appointment. If you would like to set it up sooner you may call them at one of the locations below.    Office Locations    [place]  [facility]  [address]  [place] [address]  Phone: [phone]  Fax: [phone]    [facility]   [address]  [place] [address]  Phone: [phone]  Fax: [phone]    [facility]  [address]  [place] [address]  Phone: [phone]  Fax: [phone]    [facility]  [address]  [address]  [place] [address]  Phone: [phone]  Fax: [phone]    [facility]  [address]  [address]   [place] [address]   Phone: [phone]  Fax: [phone]    [place]   [address] [place] [address]   Phone: [phone]  Fax: [phone]

## 2021-06-17 NOTE — ED TRIAGE NOTES
Patient referred to ED by primary doctor at [facility]. Patient has a history of diabetes and amputation to two toes on left foot. Patient has had increased pain and swelling to third toe. Primary doctor is concerned for infection.

## 2021-06-17 NOTE — PROGRESS NOTES
FOOT AND ANKLE SURGERY/PODIATRY Progress Note        ASSESSMENT:   June  Prominent metatarsal heads  DM2      TREATMENT:  -The 2nd and 3rd metatarsal heads are prominent on the left foot after previous medial foot amputations. There are no open lesions or skin irritation on exam today.     -I inspected his current diabetic inserts which are thin and likely not sufficient for offloading of the metatarsal heads.    -I have referred him to [facility] to have the existing diabetic inserts modified with a metatarsal head and increased multi-density padding or new insert.     -He will monitor for concerns and follow-up with me as concerns develop.    Jorge Adames DPM  [facility] Podiatry/Foot & Ankle Surgery      HPI: Prosper Kirby was seen again today for left foot pain. The patient states he has been having increased pain with walking. He has been using diabetic shoes and inserts that he obtained 2-3 months ago. He does not recall where he went for the inserts.       Past Medical History:   Diagnosis Date     Asthma      Cellulitis      Diabetes mellitus (H)      Hyperlipemia      Hypertension      Osteomyelitis (H)      Urinary retention        Past Surgical History:   Procedure Laterality Date     APPENDECTOMY       BACK SURGERY       CARPAL TUNNEL RELEASE Bilateral      FOOT AMPUTATION Left 1/8/2021    Procedure: Left  left foot with amputation of the left hallux, and partial first metatarsal amputation;  Surgeon: Jorge Adames DPM;  Location: [facility];  Service: Podiatry     INCISION AND DRAINAGE OF WOUND Left 12/3/2020    Procedure: INCISION AND DRAINAGE, Left foot with application of [place];  Surgeon: Jorge Adames DPM;  Location: [facility] OR;  Service: Podiatry     INCISION AND DRAINAGE OF WOUND Left 1/6/2021    Procedure: INCISION AND DRAINAGE, Left foot with excision of fibular sesamoid;  Surgeon: Jorge Adames DPM;  Location: [facility];  Service:  Podiatry     INCISION AND DRAINAGE OF WOUND Left 1/8/2021    Procedure: INCISION AND DRAINAGE,;  Surgeon: Jorge Adames DPM;  Location: [facility];  Service: Podiatry     PICC MIDLINE INSERTION  1/13/2021          SPINAL FUSION       TOE AMPUTATION Left 4/1/2021    Procedure: AMPUTATION, second digit left foot;  Surgeon: Jorge Adames DPM;  Location: [facility];  Service: Podiatry       No Known Allergies      Current Outpatient Medications:      acetaminophen (TYLENOL) 500 MG tablet, Take 1,000 mg by mouth every 6 (six) hours as needed for pain., Disp: , Rfl:      amLODIPine (NORVASC) 10 MG tablet, Take 10 mg by mouth daily. , Disp: , Rfl:      amoxicillin-clavulanate (AUGMENTIN) 875-125 mg per tablet, Take 1 tablet by mouth every 12 (twelve) hours for 7 days., Disp: 14 tablet, Rfl: 0     aspirin 325 MG tablet, Take 325 mg by mouth daily. , Disp: , Rfl:      glimepiride (AMARYL) 4 MG tablet, Take 4 mg by mouth daily. , Disp: , Rfl:      lisinopriL (PRINIVIL,ZESTRIL) 40 MG tablet, Take 40 mg by mouth daily., Disp: , Rfl:      metFORMIN (GLUCOPHAGE) 1000 MG tablet, Take 1,000 mg by mouth 2 (two) times a day with meals. , Disp: , Rfl:      polyethylene glycol (MIRALAX) 17 gram packet, Take 1 packet (17 g total) by mouth daily as needed., Disp:  , Rfl: 0    Current Facility-Administered Medications:      lidocaine 2 % jelly (XYLOCAINE), , Topical, PRN, Jorge Adames DPM, Given at 03/22/21 0850    Family History   Problem Relation Age of Onset     Stroke Mother      Stroke Father      No Medical Problems Sister        Social History     Socioeconomic History     Marital status:      Spouse name: Not on file     Number of children: Not on file     Years of education: Not on file     Highest education level: Not on file   Occupational History     Not on file   Social Needs     Financial resource strain: Not on file     Food insecurity     Worry: Not on file     Inability: Not on file      Transportation needs     Medical: Not on file     Non-medical: Not on file   Tobacco Use     Smoking status: Never Smoker     Smokeless tobacco: Never Used   Substance and Sexual Activity     Alcohol use: Never     Frequency: Never     Drug use: Never     Sexual activity: Not on file   Lifestyle     Physical activity     Days per week: Not on file     Minutes per session: Not on file     Stress: Not on file   Relationships     Social connections     Talks on phone: Not on file     Gets together: Not on file     Attends Rastafarian service: Not on file     Active member of club or organization: Not on file     Attends meetings of clubs or organizations: Not on file     Relationship status: Not on file     Intimate partner violence     Fear of current or ex partner: Not on file     Emotionally abused: Not on file     Physically abused: Not on file     Forced sexual activity: Not on file   Other Topics Concern     Not on file   Social History Narrative     Not on file       10 point Review of Systems is negative except for foot pain which is noted in HPI.       Vitals:    05/12/21 1426   BP: 150/62   Pulse: 70   Resp: 16       BMI= There is no height or weight on file to calculate BMI.    OBJECTIVE:  General appearance: Patient is alert and fully cooperative with history & exam.  No sign of distress is noted during the visit.  Vascular: Dorsalis pedis palpable. There is no appreciable edema noted.  Dermatologic: Turgor and texture are within normal limits. No coloration or temperature changes. No primary or secondary lesions noted.  Neurologic: Diminished to light touch bilateral.   Musculoskeletal: Mild pain plantar 1st and 2nd metatarsal heads left foot. Contracted digits 3-5 left.    Imaging:     No results found.

## 2021-06-18 NOTE — PATIENT INSTRUCTIONS - HE
Patient Instructions by Mery Anderson RN at 9/16/2020  1:40 PM     Author: Mery Anderson RN Service: -- Author Type: Registered Nurse    Filed: 9/16/2020  2:27 PM Encounter Date: 9/16/2020 Status: Signed    : Mery Anderson RN (Registered Nurse)       Continue limited walking in CAM boot on right foot.    Continue endoform on right plantar foot:    Endoform Dressing Application     1. Endoform should be cut to the size of the wound.  It should touch the edges of the ulcer.  It can overlap the edges just very small amount.  Then, moisten with saline. (If it is easier for you, you can moisten it before laying it in the wound)    2. Cover the top of the wound with dry gauze.  Secure with roll gauze and paper tape.  No tape should be directly onto skin.    3. Endoform is naturally used by the body over time so you may not find it in the wound when you change your dressing.  If you do find some, gently cleanse the wound with saline. Do not remove the remaining Endoform, which may appear as an off-white to schulte gel, just add Endoform on top.  Usually, more Endoform will need to be added every 5-7 days.     4.  Change your top dressing every 1-2 days depending on drainage.     -Endoform is a collagen dressing created from ovine (sheep) fore-stomach tissue. The collagen extracellular matrix transforms into a soft conforming sheet, which is naturally incorporated into the wound over time.  Collagen dressings are used to stimulate wound healing.       [facility] Virtual Check-In Number    [phone]    When you arrive for your IN OFFICE visit. Please call this number from the parking lot.      The staff will then virtually check you in and meet you at the door to escort you to a room.    If you arrive early and a room is not yet ready for you staff may have you wait can call you back when they are ready.     Please remember to wear a mask into your appointment     No  Visitors Allowed    If you are having any symptoms- cough, fever, rash, loss of taste and smell or shortness of breath we ask that you please call and reschedule your appointment.

## 2021-06-18 NOTE — PATIENT INSTRUCTIONS - HE
Patient Instructions by Mery Anderson RN at 10/28/2020  1:20 PM     Author: Mery Anderson RN Service: -- Author Type: Registered Nurse    Filed: 10/28/2020  1:56 PM Encounter Date: 10/28/2020 Status: Signed    : Mery Anderson RN (Registered Nurse)       Get MRI.  Your appointment is scheduled for 6:00 pm for check in.  Wear a mask, no visitors.  Imaging is located at [address] [place] [address] [address], [place] [address].  Their phone number is [phone] if you have any imaging related questions.    Continue limited walking with CAM boot on left foot.     Daily until wound vac arrives and is placed:  Cleanse with normal saline, pat dry.  Pack with strip gauze.  Cover with dry gauze.  Secure with roll gauze and paper tape.    When wound vac arrives start:        - Wound Vac Instructions    1. 3x weekly and as needed cleanse the wound with NS    2. Pat dry    3. Apply Cavilon no sting barrier wipe to the skin surrounding the wound to protect from drainage/maceration    4. Apply drape to charlene wound. Cut strip of drape and apply to skin if bridging is needed; plan this area in advance; should not be over bony prominence     5. Cut the foam to fit the size of the wound    6. Apply foam to wound    7. Cut narrow strip of foam if bridging if needed    8. Cover foam with drape to obtain air tight seal    9. Cut opening the size of a quarter for where the suction pad will be applied    10. Apply Suction pad    11. 125mmHG suction continuous     [employer] [facility] can be reached at [phone], 24 hours a day 7 days a week     - If you do not have a back up plan in place:     If the negative pressure wound therapy malfunctions or unable to maintain seal: dressing must be removed and reapplied within 2 hours of the incident. If unable to reapply negative pressure wound dressing, place Normal Saline moistened gauze in wound bed and cover with appropriate dressing to keep wound bed moist.  Change  wet-to-dry dressing two times a day until healthcare staff can re-implement negative pressure therapy. Change canister at least weekly.  [facility] Contact Center can be reached at [phone], 24 hours a day 7 days a week    - Information on Vacuum-Assisted Closure of a Wound  Vacuum-assisted closure (VAC) of a wound is a type of treatment to help wounds heal. Its also known as negative pressure wound therapy. During the treatment, a device lowers air pressure on the wound. This can help the wound heal more quickly.  - Understanding the wound VAC system  A wound VAC system has several parts. A foam or gauze dressing is put directly on the wound. The dressing is changed every 24 to 72 hours. An adhesive film covers and seals the dressing and wound. A drainage tube leads from under the adhesive film and connects to a portable vacuum pump. This pump removes air pressure over the wound. It may do this constantly. Or it may do it in cycles. During the treatment, youll need to carry the portable pump everywhere you go.  - Why wound VAC is used  You might need this therapy for a recent traumatic wound. Or you may need it for a chronic wound. This is a wound that does not heal the way it should over time. This can happen with wounds in people who have diabetes. You may need a wound VAC if youve had a recent skin graft. And you may need a wound VAC for a large wound. Large wounds can take a longer time to heal.  A wound vacuum system may help your wound heal more quickly by:    Draining extra fluid from the wound    Reducing swelling    Reducing bacteria in the wound    Keeping your wound moist and warm    Helping draw together wound edges    Increasing blood flow to your wound    Decreasing inflammation  Wound VAC offers some other advantages over other types of wound care. It may decrease your overall discomfort. The dressings usually need to be changed less often. And they may be easier to keep in position.  - Risks of wound  VAC  Wound VAC has some rare risks, such as:    Bleeding (which may be severe)    Wound infection    An abnormal connection between the intestinal tract and the skin (enteric fistula)  Proper training in dressing changes can help reduce the risk for these complications. Also, your doctor will carefully evaluate you to make sure you are a good candidate for the therapy. Certain problems can increase your risk for complications. These include:    Exposed organs or blood vessels    High risk of bleeding from another medical problem    Wound infection    Nearby bone infection    Dead wound tissue    Cancer tissue    Fragile skin, such as from aging or longtime use of topical steroids    Allergy to adhesive    Very poor blood flow to your wound    Wounds close to joints that may reopen because of movement  Your doctor will discuss the risks that apply to you. Make sure to talk with him or her about all of your questions and concerns.  - Getting ready for wound VAC  You likely wont need to do much to get ready for wound VAC. In some cases, you may need to wait a while before having this therapy. For example, your doctor may first need to treat an infection in your wound. Dead or damaged tissue may also need to be removed from your wound.  You or a caregiver may need training on how to use the wound VAC device. This is done if you will be able to have your wound vacuum therapy at home. In other cases, you may need to have your wound vacuum therapy in a health care facility.  - On the day of your procedure  A health care provider will cover your wound with foam or gauze wound dressing. An adhesive film will be put over the dressing and wound. This seals the wound. The foam connects to a drainage tube, which leads to a vacuum pump. This pump is portable. When the pump is turned on, it draws fluid through the foam and out the drainage tubing. The pump may run constantly, or it may cycle off and on. Your exact setup will  depend on the specific type of wound vacuum system that you use.  - Managing your wound  You may need the dressing changed about once a day. You may need it changed more or less often, depending on your wound. You or your caregiver may be trained to do this at home. Or it may be done by a visiting health care provider. Your doctor may prescribe a pain medicine. This is to prevent or reduce pain during the dressing change.  You will likely need to use the wound VAC system for several weeks or months. During this time, youll carry the portable pump everywhere you go.  - Nutrition for wound healing  During this time, make sure you follow a healthy diet. This is needed so the wound can heal and to prevent infection. Your doctor can tell you more about what to include in your diet during this time.  follow up with your doctor if you have a medical condition that led to your wound, such as diabetes. He or she can help you prevent future wounds.  - Follow-up care  Your doctor will carefully keep track of your healing. Make sure to keep all follow-up appointments.  - When to call your health care provider  Call your health care provider right away if you have any of these:    Fever of 100.4 F (38.0 C) or higher    Increased redness, swelling, or warmth around wound    Increased pain    Bright red blood or blood clots in tubing or the collection chamber of the vacuum               It is not ok to get your wound wet     Call the clinic for any questions regarding your wound or cares and if you experience signs or symptoms of infection including: fevers, chills, increased redness, increased drainage, foul odor, increased pain at [phone].        Magnetic Resonance Imaging (MRI)     You will be asked to hold very still during the scan.     Magnetic resonance imaging (MRI) is a test that lets your doctor see detailed pictures of the inside of your body. MRI combines the use of strong magnets and radio waves to form an MRI  image.  How do I get ready for an MRI?    Follow any directions you are given for not eating or drinking before the test.    Ask your provider if you should stop taking any medicine before the test.    Follow your normal daily routine unless your provider tells you otherwise.    You'll be asked to remove your watch, jewelry, hearing aids, credit cards, pens, pocket knives, eyeglasses, and other metal objects.    You may be asked to remove your makeup. Makeup may contain some metal.    Most MRI tests take 30 to 60 minutes. Depending on the type of MRI you are having, the test may take longer. Give yourself extra time to check in.      MRI uses strong magnets. Metal is affected by magnets and can distort the image. The magnet used in MRI can cause metal objects in your body to move. If you have a metal implant, you may not be able to have an MRI unless the implant is certified as MRI safe. People with these implants should not have an MRI:    Ear (cochlear) implants    Certain clips used for brain aneurysms    Certain metal coils put in blood vessels    Most defibrillators    Most pacemakers  Be sure to tell the radiologist or technologist if you:    Have had any previous surgeries    Have a pacemaker, surgical clips, metal plate or pins, an artificial joint, staples or screws, ear (cochlear) implants, or other implants    Wear a medicated adhesive patch    Have metal splinters in your body    Have implanted nerve stimulators or drug-infusion ports    Have tattoos or body piercings. Some tattoo inks contain metal.    Work with metal    Have braces. You must remove any dental work.    Have a bullet or other metal in your body  Also tell the radiologist or technologist if you:    Are pregnant or think you may be    Are afraid of small, enclosed spaces (claustrophobic)    Are allergic to X-ray dye (contrast medium), iodine, shellfish, or any medicines    Have other allergies    Are breastfeeding    Have a history of  cancer    Have any serious health problems. This includes kidney disease or a liver transplant. You may not be able to have the contrast material used for MRI.   What happens during an MRI?    You may be asked to wear a hospital gown.    You may be given earplugs to wear if you need them.    You may be injected with a special dye (contrast) that improves the MRI image.     Youll lie down on a platform that slides into the magnet.  What happens after an MRI?    You can get back to normal activities right away. If you were given contrast, it will pass naturally through your body within a day. You may be told to drink more water or other fluids during this time.     Your doctor will discuss the test results over the phone.    Date Last Reviewed: 6/1/2017 2000-2017 [employer]. [address] [place] [address], [place] [address]. All rights reserved. This information is not intended as a substitute for professional medical care. Always follow your healthcare professional's instructions.          [facility] Virtual Check-In Number    [phone]    When you arrive for your IN OFFICE visit. Please call this number from the parking lot.      The staff will then virtually check you in and meet you at the door to escort you to a room.    If you arrive early and a room is not yet ready for you staff may have you wait can call you back when they are ready.     Please remember to wear a mask into your appointment     No Visitors Allowed    If you are having any symptoms- cough, fever, rash, loss of taste and smell or shortness of breath we ask that you please call and reschedule your appointment.

## 2021-06-18 NOTE — PATIENT INSTRUCTIONS - HE
Patient Instructions by Mery Anderson RN at 8/24/2020  2:00 PM     Author: Mery Anderson RN Service: -- Author Type: Registered Nurse    Filed: 8/24/2020  3:17 PM Encounter Date: 8/24/2020 Status: Signed    : Mery Anderson RN (Registered Nurse)       Limited walking on left foot, wear CAM boot for stability.    Start endoform on left plantar foot:       Dressing Application     1. Endoform should be cut to the size of the wound.  It should touch the edges of the ulcer.  It can overlap the edges just very small amount.  Then, moisten with saline. (If it is easier for you, you can moisten it before laying it in the wound)    2. Cover the top of the wound with dry gauze.  Secure with roll gauze and paper tape.    3. Endoform is naturally used by the body over time so you may not find it in the wound when you change your dressing.  If you do find some, gently cleanse the wound with saline. Do not remove the remaining Endoform, which may appear as an off-white to schulte gel, just add Endoform on top.  Usually, more Endoform will need to be added every 5-7 days.     4.  Change your top dressing every 1-2 days depending on drainage.     -Endoform is a collagen dressing created from ovine (sheep) fore-stomach tissue. The collagen extracellular matrix transforms into a soft conforming sheet, which is naturally incorporated into the wound over time.  Collagen dressings are used to stimulate wound healing.         [facility] Virtual Check-In Number    [phone]    When you arrive for your IN OFFICE visit. Please call this number from the parking lot.      The staff will then virtually check you in and meet you at the door to escort you to a room.    If you arrive early and a room is not yet ready for you staff may have you wait can call you back when they are ready.     Please remember to wear a mask into your appointment     No Visitors Allowed    If you are having any  symptoms- cough, fever, rash, loss of taste and smell or shortness of breath we ask that you please call and reschedule your appointment.

## 2021-06-18 NOTE — PATIENT INSTRUCTIONS - HE
Patient Instructions by Mery Anderson RN at 10/7/2020  1:20 PM     Author: Mery Anderson RN Service: -- Author Type: Registered Nurse    Filed: 10/7/2020  1:44 PM Encounter Date: 10/7/2020 Status: Signed    : Mery Anderson RN (Registered Nurse)       Continue limited walking on left foot with CAM boot.    Continue Endoform on left plantar foot:    Endoform Dressing Application     1. Endoform should be cut to the size of the wound.  It should touch the edges of the ulcer.  It can overlap the edges just very small amount.  Then, moisten with saline. (If it is easier for you, you can moisten it before laying it in the wound)    2. Cover the top of the wound with dry gauze.  Secure with roll gauze and paper tape.  No tape should be directly onto skin.    3. Endoform is naturally used by the body over time so you may not find it in the wound when you change your dressing.  If you do find some, gently cleanse the wound with saline. Do not remove the remaining Endoform, which may appear as an off-white to schulte gel, just add Endoform on top.  Usually, more Endoform will need to be added every 4 days.     4.  Change your top dressing every 4 days depending on drainage.     -Endoform is a collagen dressing created from ovine (sheep) fore-stomach tissue. The collagen extracellular matrix transforms into a soft conforming sheet, which is naturally incorporated into the wound over time.  Collagen dressings are used to stimulate wound healing.

## 2021-06-21 NOTE — LETTER
Letter by Jorge Adames DPM at      Author: Jorge Adames DPM Service: -- Author Type: --    Filed:  Encounter Date: 3/22/2021 Status: (Other)       Surgery Information    Surgery Date 4/1    Surgery Time 11:30am    COVID TEST: 3/29 11:30 at [facility]    Post op Visit 4/8 @ 11:20    PRE OP PHYSICAL( you will have to call your primary doctor to set this up)    ( Arrive at the hospital two hours prior to your surgery and 1 hour prior at the surgery center. Check in at the . The only exception is if you are scheduled for surgery at 7am, you should arrive at 5:30am)    IF YOU NEED TO RESCHEDULE OR CANCEL YOUR SURGERY FOR ANY REASON PLEASE CALL THE CLINIC AS SOON AS POSSIBLE -[phone].    Admission Type: Inpatient    Surgeon: Dr. Adames    Surgery Procedure:     Surgery Location: [facility]      If you take Blood Thinners Such as:  Warfarin, Xarelto, Plavix, Aspirin or Eliquis this will need to be HELD prior to procedure according to primary care provider/doctor or cardiology who prescribes this medication. Generally this is 5 days.    Additional Information: If you use a CPAP machine for sleep apnea please bring it with you for surgery. We will want to monitor your breathing using your normal equipment.     HOSPITAL AND SURGERY CENTER INFORMATION    We need to know a lot of information about you before surgery.     1-5 Days Before:     A nurse will call you for a pre-screening interview. The phone call with the nurse will be much faster and easier if you  Have organized your information prior to the call.     Please have the following information available:    Preoperative Exam completed and faxed to our office    Primary care provider's and any specialty providers' contact information available. .    If requested by your primary care provider, have any heart and lung exams at least 3 days before surgery.    Have a complete and accurate list of medications available.     Have a list of your  allergies/sensitivities and reactions    Know your health history, surgical history, and medical problems    Know any anesthesia issues with you or within your family.     BE SURE TO NOTIFY US IF YOU ARE TAKING ANY BLOOD THINNING AGENTS: Coumadin, Plavix, Aspirin, Xarelto, Eliquis    Someone planned to bring you and stay with you after the surgery    Day Before Surgery    1. STOP Smoking and Drinking: It is important to stop smoking and drinking at least 24 hours before surgery. Smoking and drinking may cause complications in your recovery from anesthesia and may lengthen the healing process.    2. Pack for your hospital stay: If it will be required for you to stay at the hospital after surgery, bring personal items such as a robe, slippers, pajamas, additional clothing and toiletries. Don't forget:    List of medication    Eyeglass case or contact case with solution. You cannot wear contacts during surgery    Copies of your physical exam , lab results and EKG    Copy of Health Care Directives, Living Will or Power of     Insurance Cards    Photo ID    CPAP machine    3. NOTHING BY MOUTH 8 HOURS BEFORE. This includes gum, hard candy, water and mints. The only exception is if you have been instructed by your doctor to take your medications with sips of water. You may rinse your mouth or brush your teeth, but do not swallow water.     4. Remove Nail Polish  Day of Surgery    1. Medications- Take as indicated with sips of water     2. Wear comfortable loose fitting clothes. Wear your glasses-Not contacts. Do not wear jewelry and remove body piercing's. Surgery may be cancelled if they are not removed.     3. If same day surgery-Have a someone come with you to surgery that can help you understand the surgeon's instructions, drive you home and stay with you overnight the first night.     4. A nurse will call you at home within 72 hours following surgery to see how you are doing. Our nurses and doctors will  discuss recovery with you.

## 2021-06-29 NOTE — PROGRESS NOTES
Progress Notes by Jorge Adames DPM at 10/28/2020  1:20 PM     Author: Jorge Adames DPM Service: -- Author Type: Physician    Filed: 10/28/2020  4:09 PM Encounter Date: 10/28/2020 Status: Signed    : Jorge Adames DPM (Physician)       FOOT AND ANKLE SURGERY/PODIATRY Progress Note      ASSESSMENT:   Diabetic Ulceration left foot       TREATMENT:  -The left foot ulceration has significantly increased in depth, now probes to capsule. I again discussed the importance of non-weight bearing on the left foot and use of knee walker or TCC, patient again declines. I now recommend use of a wound vac to increase granulation tissue. Also referred for an MRI to evaluate for underlying osteomyelitis.     -After discussion of risk factors and consent obtained 2% Lidocaine HCL jelly was applied, under clean conditions, the left and foot ulceration(s) were debrided using currette.  Devitalized and nonviable tissue, along with any fibrin and slough, was removed to improve granulation tissue formation, stimulate wound healing, decrease overall bacteria load, disrupt biofilm formation and decrease edge senescence.  Total excisional debridement was 0.91 sq cm into the muscle/fascia with a depth of 0.6 cm.   Ulcers were improved afterwards and .  Measures were as noted on the flow sheet. Patient tolerated this well. Wound was packed with gauze and a gauze dressing. He will continue to apply this dressing until the wound vac is applied.    -He will follow-up with me in 2 weeks.    Jorge Adames DPM  [facility]        HPI: Prosper Kirby was seen again today for a left foot ulceration. Since his last visit, he has tried to remain limited walking in the CAM boot. Currently applying endoform.     History reviewed. No pertinent past medical history.    Past Surgical History:   Procedure Laterality Date   ? APPENDECTOMY     ? CARPAL TUNNEL RELEASE Bilateral    ? SPINAL FUSION         No  Known Allergies      Current Outpatient Medications:   ?  amLODIPine (NORVASC) 10 MG tablet, Take 10 mg by mouth., Disp: , Rfl:   ?  aspirin 325 MG tablet, Take 325 mg by mouth., Disp: , Rfl:   ?  glimepiride (AMARYL) 4 MG tablet, Take 4 mg by mouth., Disp: , Rfl:   ?  lisinopriL (PRINIVIL,ZESTRIL) 20 MG tablet, Take 20 mg by mouth., Disp: , Rfl:   ?  metFORMIN (GLUCOPHAGE) 500 MG tablet, Take 500-1,000 mg by mouth., Disp: , Rfl:   ?  omeprazole (PRILOSEC) 20 MG capsule, Take 20 mg by mouth., Disp: , Rfl:     Review of Systems - 10 point Review of Systems is negative except for left foot ulcer which is noted in HPI.      OBJECTIVE:  Vitals:    10/28/20 1316   BP: 128/68   Pulse: 76   Resp: 20   Temp: 96.6  F (35.9  C)     General appearance: Patient is alert and fully cooperative with history & exam.  No sign of distress is noted during the visit.   Vascular: Dorsalis pedis palpableLeft.  Dermatologic:    VASC Wound 08/24/20 left plantar hallux (Active)   Pre Size Length 1.3 10/28/20 1300   Pre Size Width 0.7 10/28/20 1300   Pre Size Depth 0.6 10/28/20 1300   Pre Total Sq cm 0.91 10/28/20 1300   Post Size Length 1.4 10/07/20 1300   Post Size Width 1.4 10/07/20 1300   Post Size Depth 0.2 10/07/20 1300   Post Total Sq cm 1.96 10/07/20 1300   Undermined no 10/28/20 1300   Tunneling no 10/28/20 1300   Description pink,epibole 10/28/20 1300   Prodcut Used Gauze 10/28/20 1300   Ulceration plantar 1st MPJ probes to deep tissues, possible to capsule. No erythema or active drainage.   Neurologic: Diminished to light touch Left.  Musculoskeletal: Contracted digits noted Left.    Imaging:     No results found.       Picture:

## 2021-06-30 ENCOUNTER — RECORDS - HEALTHEAST (OUTPATIENT)
Dept: LAB | Facility: CLINIC | Age: 76
End: 2021-06-30

## 2021-06-30 LAB
BASOPHILS # BLD AUTO: 0.1 THOU/UL (ref 0–0.2)
BASOPHILS NFR BLD AUTO: 1 % (ref 0–2)
C REACTIVE PROTEIN LHE: 1.7 MG/DL (ref 0–0.8)
EOSINOPHIL # BLD AUTO: 0.3 THOU/UL (ref 0–0.4)
EOSINOPHIL NFR BLD AUTO: 4 % (ref 0–6)
ERYTHROCYTE [DISTWIDTH] IN BLOOD BY AUTOMATED COUNT: 12.9 % (ref 11–14.5)
HBA1C MFR BLD: 7.3 %
HCT VFR BLD AUTO: 39 % (ref 40–54)
HGB BLD-MCNC: 13.5 G/DL (ref 14–18)
IMM GRANULOCYTES # BLD: 0 THOU/UL
IMM GRANULOCYTES NFR BLD: 0 %
LYMPHOCYTES # BLD AUTO: 2.1 THOU/UL (ref 0.8–4.4)
LYMPHOCYTES NFR BLD AUTO: 25 % (ref 20–40)
MCH RBC QN AUTO: 30.3 PG (ref 27–34)
MCHC RBC AUTO-ENTMCNC: 34.6 G/DL (ref 32–36)
MCV RBC AUTO: 88 FL (ref 80–100)
MONOCYTES # BLD AUTO: 0.7 THOU/UL (ref 0–0.9)
MONOCYTES NFR BLD AUTO: 9 % (ref 2–10)
NEUTROPHILS # BLD AUTO: 5.1 THOU/UL (ref 2–7.7)
NEUTROPHILS NFR BLD AUTO: 61 % (ref 50–70)
PLATELET # BLD AUTO: 250 THOU/UL (ref 140–440)
PMV BLD AUTO: 10.2 FL (ref 8.5–12.5)
RBC # BLD AUTO: 4.45 MILL/UL (ref 4.4–6.2)
WBC: 8.3 THOU/UL (ref 4–11)

## 2021-06-30 NOTE — PROGRESS NOTES
Progress Notes by Jorge Adames DPM at 3/22/2021  9:00 AM     Author: Jorge Adames DPM Service: -- Author Type: Physician    Filed: 3/22/2021  1:23 PM Encounter Date: 3/22/2021 Status: Signed    : Jorge Adames DPM (Physician)       FOOT AND ANKLE SURGERY/PODIATRY Progress Note      ASSESSMENT:   Ulceration/Osteomyelitis/Cellulitis 2nd digit left foot  Hammertoe      TREATMENT:  -The ulceration along the 2nd digit left foot probes to bone with localized erythema. Due to the presence of osteomyelitis, I reviewed the treatment options to include either 6 weeks IV antibiotics with Infectious Disease vs amputation of the involved bone. The patient would like to proceed with amputation of the digit for a more definitive procedure.     -We discussed that the development of the ulceration was likely due to the hammertoe deformity, including surgical correction of the remaining hammertoes to prevent future skin breakdown. We will consider this procedure after the upcoming amputation.    -After discussion of risk factors and consent obtained 2% Lidocaine HCL jelly was applied, under clean conditions, the left and foot ulceration(s) were debrided using currette.  Devitalized and nonviable tissue, along with any fibrin and slough, was removed to improve granulation tissue formation, stimulate wound healing, decrease overall bacteria load, disrupt biofilm formation and decrease edge senescence.  Total excisional debridement was 0.2 sq cm bone with a depth of 0.5 cm.   Ulcers were improved afterwards and .  Measures were as noted on the flow sheet. Patient tolerated this well. A gauze dressing was applied. He will continue to apply a gauze dressing qday.    -I will ask my office to coordinate surgery at [facility]. He is a poorly controlled diabetic, and I will plan to admit to observation after the procedure. He will follow-up with Dr. Fuentes's office for a pre-op physical.     Jorge  JAMEY Adames  [facility]      HPI: Prosper Kirby was seen again today for a new wound on the 2nd digit left foot. The patient was recently seen by Dr. Mcneil and continues to take keflex for an infection on the 2nd digit. The patient denies N/V/F/C and denies any known trauma to the digit.       Past Medical History:   Diagnosis Date   ? Diabetes mellitus (H)    ? Hypertension    ? Urinary retention        Past Surgical History:   Procedure Laterality Date   ? APPENDECTOMY     ? CARPAL TUNNEL RELEASE Bilateral    ? FOOT AMPUTATION Left 1/8/2021    Procedure: Left  left foot with amputation of the left hallux, and partial first metatarsal amputation;  Surgeon: Jorge Adames DPM;  Location: [facility] [place];  Service: Podiatry   ? INCISION AND DRAINAGE OF WOUND Left 12/3/2020    Procedure: INCISION AND DRAINAGE, Left foot with application of Theraskin;  Surgeon: Jorge Adames DPM;  Location: [facility] [place];  Service: Podiatry   ? INCISION AND DRAINAGE OF WOUND Left 1/6/2021    Procedure: INCISION AND DRAINAGE, Left foot with excision of fibular sesamoid;  Surgeon: Jorge Adames DPM;  Location: [facility] [place];  Service: Podiatry   ? INCISION AND DRAINAGE OF WOUND Left 1/8/2021    Procedure: INCISION AND DRAINAGE,;  Surgeon: Jorge Adames DPM;  Location: [facility] [place];  Service: Podiatry   ? PICC MIDLINE INSERTION  1/13/2021        ? SPINAL FUSION         No Known Allergies      Current Outpatient Medications:   ?  acetaminophen (TYLENOL) 500 MG tablet, Take 1,000 mg by mouth every 6 (six) hours as needed for pain., Disp: , Rfl:   ?  amLODIPine (NORVASC) 10 MG tablet, Take 10 mg by mouth daily. , Disp: , Rfl:   ?  aspirin 325 MG tablet, Take 325 mg by mouth daily. , Disp: , Rfl:   ?  bacitracin 500 unit/gram Pack, Apply 1 packet topically once as needed (PICC site care - upon discontinuation of PICC)., Disp: 25 packet, Rfl: 0  ?  cephalexin (KEFLEX) 500 MG  capsule, Take 500 mg by mouth 3 (three) times a day., Disp: , Rfl:   ?  clindamycin (CLEOCIN) 300 MG capsule, Take 1 capsule (300 mg total) by mouth 3 (three) times a day for 7 days., Disp: 21 capsule, Rfl: 0  ?  glimepiride (AMARYL) 4 MG tablet, Take 4 mg by mouth daily. , Disp: , Rfl:   ?  HYDROcodone-acetaminophen 5-325 mg per tablet, TAKE 1-2 TABLETS BY MOUTH EVERY 4-6 HOURS AS NEEDED FOR PAIN, Disp: , Rfl:   ?  lisinopriL (PRINIVIL,ZESTRIL) 20 MG tablet, Take 20 mg by mouth 2 (two) times a day. , Disp: , Rfl:   ?  metFORMIN (GLUCOPHAGE) 1000 MG tablet, Take 1,000 mg by mouth 2 (two) times a day with meals., Disp: , Rfl:   ?  polyethylene glycol (MIRALAX) 17 gram packet, Take 1 packet (17 g total) by mouth daily as needed., Disp:  , Rfl: 0  ?  oxyCODONE (ROXICODONE) 5 MG immediate release tablet, Take 1 tablet (5 mg total) by mouth every 4 (four) hours as needed., Disp: 13 tablet, Rfl: 0    Current Facility-Administered Medications:   ?  lidocaine 2 % jelly (XYLOCAINE), , Topical, Once, Geneva Mcneil MD  ?  lidocaine 2 % jelly (XYLOCAINE), , Topical, PRN, Jorge Adames, DPM, Given at 03/22/21 0850    Review of Systems - 10 point Review of Systems is negative except for ulcer 2nd digit left foot which is noted in HPI.      OBJECTIVE:  Vitals:    03/22/21 0840   BP: 146/58   Pulse: 72   Resp: 18   Temp: 97.2  F (36.2  C)     General appearance: Patient is alert and fully cooperative with history & exam.  No sign of distress is noted during the visit.   Vascular: Dorsalis pedis non-palpableLeft.  Dermatologic:    Drain Left Foot 10 Fr. (Active)       VASC Wound 03/16/21 left 2nd toe (Active)       VASC Wound 03/22/21 Lt 2nd toe (Active)   Pre Size Length 0.4 03/22/21 0800   Pre Size Width 0.5 03/22/21 0800   Pre Size Depth 0.2 03/22/21 0800   Pre Total Sq cm 0.2 03/22/21 0800       Incision 01/08/21 Surgical Foot Left (Active)   Ulceration distal 2nd digit left foot probes to bone with localized  erythema to the distal digit, no ascending cellulitis.    Neurologic: Diminished to light touch Left.  Musculoskeletal: Contracted digits noted Left.    Imaging:     Us Macario Doppler No Exercise, 1-2 Levels, Bilateral    Result Date: 3/16/2021  BILATERAL RESTING ANKLE-BRACHIAL INDICES (MACARIO'S) (03/16/21) Indication: SURVEILLANCE MACARIO'S: PAD, S/P LEFT FOOT 1ST RAY AMPUTATION (1/8/2021). Previous: NONE History: Hypertension, Diabetic, PAD, Vascular Surgery and Surgical Follow-up  Resting MACARIO's          Right: mmHg Index     Brachial: 154  Ankle-(PT): 198 1.29 Ankle-(DP): 170 1.10           Digit: 129 0.84               Left: mmHg Index     Brachial: 149  Ankle-(PT): 178 1.16 Ankle-(DP): 165 1.07           Digit: (2nd digit)  135 0.88 Resting ankle-brachial index of 1.29 on the right. Toe Pressures of 129 mmHg and TBI of 0.84  Resting ankle-brachial index of 1.16 on the left. Toe Pressures of 135 mmHg and TBI of 0.88  WAVEFORMS: The right dorsalis pedis and posterior tibial arteries show triphasic waveforms. The left dorsalis pedis and posterior tibial arteries show triphasic  waveforms.  Impression:  1. RIGHT LOWER EXTREMITY: MACARIO is Normal with an MACARIO of 1.29. and Normal toe pressures of 129 mmHg. 2. LEFT LOWER EXTREMITY: MACARIO is Normal with an MACARIO of 1.16. and Normal toe pressures of 135 mmHg. Reference: Wound classification Grade MACARIO Ankle Systolic Pressure Toe Pressures 0 > 0.80 > 100 mmHg > 60 mmHg 1 0.6 - 0.79 70 - 100 mmHg 40 - 59 mmHg 2 0.4 - 0.59 50-70 mmHg 30 - 39 mmHg 3 < 0.39 < 50 mmHg < 30 mmHg Digit Pressures DBI Disease Category > 0.70 Normal < 0.70 Abnormal > 30 mmHg Potential wound healing < 30 mmHg Impaired wound healing Ankle Brachial Pressures MACARIO Disease Category > 1.3  Likely vessel calcification with monophasic waveforms, non-diagnostic 0.95-1.30 Normal with multiphasic waveforms 0.50-0.95 Single level disease 0.30-0.50 Multilevel disease < 0.30 Critical limb ischema Volume Plethysmography Recording  (VPR) at all levels Normal Sharp systolic peak, fast upstroke, prominent dicrotic notch in wave Mild Sharp systolic peak, fast upstroke, absent dicrotic notch in wave Moderate Flattened systolic peak, slowed upstroke, absent dicrotic notch in wave Severe Low-amplitude wave with = upslope and down slope Occluded Flat Line Multiple Level Segmental Pressures  Normal Abnormal Upper Thigh > 1.2 pressure indices, <30 mmHg between lateral and horizontal cuffs < 0.8 pressure indices suggest proximal occlusion, 0.8-1.2 pressure indices suggest inflow disease, > 30 mmHg between lateral and horizontal cuffs  Lower Thigh < 30 mmHg between lateral and horizontal cuffs > 30 mmHg between lateral and horizontal cuffs Upper Calf < 30 mmHg between lateral and horizontal cuffs > 30 mmHg between lateral and horizontal cuffs Note: As limb diameter increases, pressure measurements also increase.    Us Arterial Leg Left    Result Date: 3/16/2021  Arterial Duplex Ultrasound (03/16/21) Lower Extremity Artery Evaluation Indication: SURVEILLANCE LEFT LEG: PAD, S/P LEFT FOOT 1ST RAY AMPUTATION (1/8/2021).  Previous: NONE History: Hypertension, Diabetic, PAD, Vascular Surgery and Surgical Follow-up Technique: Duplex imaging is performed utilizing gray-scale, two-dimensional images, and color-flow imaging. Doppler waveform analysis and spectral Doppler imaging is also performed. LOWER EXTREMITY ARTERIAL DUPLEX EXAM WITH WAVEFORMS Right Leg:(cm/s) Location: Velocities Waveforms PTA:   109   B RAD:   96  B DPA:   115  B Waveforms: T=Triphasic, M=Monophasic, B=Biphasic Left Leg:(cm/s) Location: Velocities Waveforms EIA:   142  B CFA:   142  B PFA:   106  B SFA Proximal:   120  B SFA Mid:   156  T SFA Distal:   199  T Popliteal Artery:   150/107  B PTA:   115   B RAD:   133  B DPA:   112  T Waveforms: T=Triphasic, M=Monophasic, B=Biphasic Impression: Right Lower Extremity: Patent posterior tibial, anterior tibial and dorsalis pedis arteries with  biphasic flow. Left Lower Extremity: Patent lower extremity vasculature with multiphasic flow. No significant stenosis. Reference: Category Normal 1-19% 20-49% 50-99% Occluded PSV <160 cm/sec without spectral broadening <160 cm/sec with spectral broadening Increased Increased Absent Flow Ratio N/A N/A < 2.0 >2.0 N/A Post-Stenotic Turbulence No No No Yes N/A          Picture:

## 2021-06-30 NOTE — PROGRESS NOTES
Progress Notes by Jorge Adames DPM at 11/18/2020  1:00 PM     Author: Jorge Adames DPM Service: -- Author Type: Physician    Filed: 11/18/2020  3:33 PM Encounter Date: 11/18/2020 Status: Signed    : Jorge Adames DPM (Physician)       FOOT AND ANKLE SURGERY/PODIATRY Progress Note      ASSESSMENT:   Diabetic Ulceration left foot      TREATMENT:  -The plantar 1st MPJ ulceration continues to probe to deep tissues with significant undermining. I discussed with the patient that the wound vac has not improved the wound, likely due to persistent weight bearing. Again discussed risks of walking including infection and need for amputation. We will hold the wound vac at this time as it is not helping, and plan to resume after application of graft.     -I have referred him for a knee walker and crutches, recommend non-weight bearing at all times. Also discussed surgical debridement to remove undermining with application of Theraskin. Reviewed potential risks of surgery including but not limited to infection, bleeding complications and need for additional surgery including amputation. If he walks post-op, we discussed that the graft may be pushed off of the wound. Patient consents to surgery.     -After discussion of risk factors and consent obtained 2% Lidocaine HCL jelly was applied, under clean conditions, the left and foot ulceration(s) were debrided using #15 blade scalpel.  Devitalized and nonviable tissue, along with any fibrin and slough, was removed to improve granulation tissue formation, stimulate wound healing, decrease overall bacteria load, disrupt biofilm formation and decrease edge senescence.  Total excisional debridement was 1.96 sq cm into the muscle/fascia with a depth of 2 cm.   Ulcers were improved afterwards and .  Measures were as noted on the flow sheet. Patient tolerated this well. Wound was packed with gauzes and a gauze dressing which will be changed daily.     -I will  ask my office to coordinate surgery at [facility]. He will follow-up with Dr. Fuentes's office for a pre-op physical.     Jorge Adames DPM  [facility] [place]      HPI: Prosper Kirby was seen again today for a left foot ulceration. He continues to walk in a CAM boot, and has used the wound vac as directed.       No past medical history on file.    Past Surgical History:   Procedure Laterality Date   ? APPENDECTOMY     ? CARPAL TUNNEL RELEASE Bilateral    ? SPINAL FUSION         No Known Allergies      Current Outpatient Medications:   ?  amLODIPine (NORVASC) 10 MG tablet, Take 10 mg by mouth., Disp: , Rfl:   ?  aspirin 325 MG tablet, Take 325 mg by mouth., Disp: , Rfl:   ?  glimepiride (AMARYL) 4 MG tablet, Take 4 mg by mouth., Disp: , Rfl:   ?  lisinopriL (PRINIVIL,ZESTRIL) 20 MG tablet, Take 20 mg by mouth., Disp: , Rfl:   ?  metFORMIN (GLUCOPHAGE) 500 MG tablet, Take 500-1,000 mg by mouth., Disp: , Rfl:   ?  omeprazole (PRILOSEC) 20 MG capsule, Take 20 mg by mouth., Disp: , Rfl:     Review of Systems - 10 point Review of Systems is negative except for left foot ulcer which is noted in HPI.      OBJECTIVE:  Vitals:    11/18/20 1309   BP: 132/68   Pulse: 80   Temp: 98.5  F (36.9  C)     General appearance: Patient is alert and fully cooperative with history & exam.  No sign of distress is noted during the visit.   Vascular: Dorsalis pedis palpableLeft.  Dermatologic:    VASC Wound 08/24/20 left plantar hallux (Active)   Pre Size Length 1.3 11/18/20 1300   Pre Size Width 0.2 11/18/20 1300   Pre Size Depth 2 11/18/20 1300   Pre Total Sq cm 0.26 11/18/20 1300   Post Size Length 1.4 10/07/20 1300   Post Size Width 1.4 10/07/20 1300   Post Size Depth 0.2 10/07/20 1300   Post Total Sq cm 1.96 10/07/20 1300   Undermined no 10/28/20 1300   Tunneling no 10/28/20 1300   Description pink,epibole 10/28/20 1300   Prodcut Used Gauze 10/28/20 1300   Ulceration plantar 1st MPJ left foot probes to deep  "tissues with 2\" of undermining. No purulence or erythema left foot.  Neurologic: Diminished to light touch Left.  Musculoskeletal: Contracted digits noted Left.    Imaging:     Mr Forefoot With Without Contrast Left    Result Date: 10/29/2020  EXAM DATE:         10/28/2020 EXAM: MRI FOOT LEFT W/O AND WITH CONTRAST LOCATION: [facility] DATE/TIME: 10/28/2020 6:15 PM INDICATION: Osteomyelitis 1st MPJ left foot, forefoot pain. COMPARISON: 2/10/2020 radiographs. TECHNIQUE: Routine. Additional postgadolinium T1 sequences were obtained. IV CONTRAST: 20 mL of IV Dotarem was administered from a single use vial. SPR I-STAT: Cr=0.9 mg/dL, estimated GFR=82 mL/min. FINDINGS: JOINTS AND BONES: No evidence of osteomyelitis. No fracture or contusion. Mild degenerative changes at the 1st MTP joint. No joint effusion. Moderate degenerative changes at the articulation between the 1st metatarsal head and fibular sesamoid. TENDONS: No tendon tear, tendinopathy, or tenosynovitis. LIGAMENTS: Lisfranc ligament intact. No subluxation. MUSCLES AND SOFT TISSUES: Small soft tissue ulcer along the plantar aspect of the 1st MTP joint with some subjacent slightly ill-defined subcutaneous fluid which contains a few tiny foci of gas as seen on series 9, image 25; and series 6, image 7. No evidence of a discrete abscess. Marked global atrophy of the intrinsic musculature of the foot. IMPRESSION: 1.  No evidence of osteomyelitis. 2.  No definite evidence of discrete abscess. There is some fluid at the deep extent of the small soft tissue ulcer along the plantar aspect of the 1st MTP joint. 3.  Mild degenerative changes at the 1st MTP joint. 4.  Marked global atrophy of the intrinsic muscular of the foot.          Picture:              "

## 2021-06-30 NOTE — PROGRESS NOTES
Progress Notes by Geneva Mcneil MD at 3/16/2021 12:00 PM     Author: Geneva Mcneil MD Service: -- Author Type: Physician    Filed: 3/16/2021  2:05 PM Encounter Date: 3/16/2021 Status: Signed    : Geneva Mcneil MD (Physician)             [facility] Lymphedema/Swelling Consult        Referred By: Alin Fuentes MD    Date Of Service: 03/16/2021    Chief Complaint: Left toe ulcer    History of Present Illness:    Prosper Kirby presents to the [facility] as a new consult to me for left toe ulceration and concern for infection.  Mr. Kirby was originally seen by Dr. Adames in August 2020 for left foot diabetic foot ulcer which had been there for 8 months.  He was debrided and endoform followed by gauze and cam walker were provided.  The wound unfortunately got better.  Wound VAC was tried without improvement.  He eventually went to the OR with debridement of TheraSkin placement on 12/3/2020.  This did not work and he went on to amputation of the left hallux with partial first met amputation on 1/8/2021.  Osteomyelitis was found on the MRI and infectious disease Dr. Boo also saw him.  Cultures were positive for MSSA, coag negative staph and diphtheroids.  He was discharged with 6 weeks of cefazolin per PICC line and was to follow-up with Dr. Boo.  He last saw Dr. Adames on 2/11/2021 in the left foot was healing well.  He was to continue his cam walker.  Diabetic shoes and insoles were ordered.  The clinic here received a call yesterday from his home health care and was concerned about the left second toenail dark deviating getting black with drainage.  There was redness going up 50%.  He was on antibiotics per nursing report.  This was to continue for 2 more days.  Because of concerns he was brought into clinic today.  When I reviewed his chart I did not see recent arterial studies.  In view of his underlying type 2 diabetes with  peripheral neuropathy and diabetic foot ulcers leading to amputation arterial studies have been ordered. MACARIO initial reports looks good.  He is not having any pain.  There has been no new numbness, tingling or weakness.  He has not had a fever.  Present wound care has been silvercel and gauze.  He admits he sometimes just wears socks at home.    Past Medical History:   Diagnosis Date   ? Diabetes mellitus (H)    ? Hypertension    ? Urinary retention        Past Surgical History:   Procedure Laterality Date   ? APPENDECTOMY     ? CARPAL TUNNEL RELEASE Bilateral    ? FOOT AMPUTATION Left 1/8/2021    Procedure: Left  left foot with amputation of the left hallux, and partial first metatarsal amputation;  Surgeon: Jorge Adames DPM;  [place] [facility] [place]iatry   ? INCISION AND DRAINAGE OF WOUND Left 12/3/2020    Procedure: INCISION AND DRAINAGE, Left foot with application of [place];  Surgeon: Jorge Adames DPM;  [place] [facility] [place]  Service: Podiatry   ? INCISION AND DRAINAGE OF WOUND Left 1/6/2021    Procedure: INCISION AND DRAINAGE, Left foot with excision of fibular sesamoid;  Surgeon: Jorge Adames DPM;  [place] [facility] [place]  Service: Podiatry   ? INCISION AND DRAINAGE OF WOUND Left 1/8/2021    Procedure: INCISION AND DRAINAGE,;  Surgeon: Jorge Adames DPM;  [place] [facility] [place]  Service: Podiatry   ? PICC MIDLINE INSERTION  1/13/2021        ? SPINAL FUSION           Current Outpatient Medications:   ?  acetaminophen (TYLENOL) 500 MG tablet, Take 1,000 mg by mouth every 6 (six) hours as needed for pain., Disp: , Rfl:   ?  amLODIPine (NORVASC) 10 MG tablet, Take 10 mg by mouth daily. , Disp: , Rfl:   ?  aspirin 325 MG tablet, Take 325 mg by mouth daily. , Disp: , Rfl:   ?  bacitracin 500 unit/gram Pack, Apply 1 packet topically once as needed (PICC site care - upon discontinuation of PICC)., Disp: 25 packet, Rfl: 0  ?  glimepiride (AMARYL) 4 MG  tablet, Take 4 mg by mouth daily. , Disp: , Rfl:   ?  HYDROcodone-acetaminophen 5-325 mg per tablet, TAKE 1-2 TABLETS BY MOUTH EVERY 4-6 HOURS AS NEEDED FOR PAIN, Disp: , Rfl:   ?  lisinopriL (PRINIVIL,ZESTRIL) 20 MG tablet, Take 20 mg by mouth 2 (two) times a day. , Disp: , Rfl:   ?  metFORMIN (GLUCOPHAGE) 1000 MG tablet, Take 1,000 mg by mouth 2 (two) times a day with meals., Disp: , Rfl:   ?  oxyCODONE (ROXICODONE) 5 MG immediate release tablet, Take 1 tablet (5 mg total) by mouth every 4 (four) hours as needed., Disp: 13 tablet, Rfl: 0  ?  polyethylene glycol (MIRALAX) 17 gram packet, Take 1 packet (17 g total) by mouth daily as needed., Disp:  , Rfl: 0  ?  cephalexin (KEFLEX) 500 MG capsule, Take 500 mg by mouth 3 (three) times a day., Disp: , Rfl:     Current Facility-Administered Medications:   ?  lidocaine 2 % jelly (XYLOCAINE), , Topical, Once, Geneva Mcneil MD    No Known Allergies    Social History     Socioeconomic History   ? Marital status:      Spouse name: Not on file   ? Number of children: Not on file   ? Years of education: Not on file   ? Highest education level: Not on file   Occupational History   ? Not on file   Social Needs   ? Financial resource strain: Not on file   ? Food insecurity     Worry: Not on file     Inability: Not on file   ? Transportation needs     Medical: Not on file     Non-medical: Not on file   Tobacco Use   ? Smoking status: Never Smoker   ? Smokeless tobacco: Never Used   Substance and Sexual Activity   ? Alcohol use: Never     Frequency: Never   ? Drug use: Never   ? Sexual activity: Not on file   Lifestyle   ? Physical activity     Days per week: Not on file     Minutes per session: Not on file   ? Stress: Not on file   Relationships   ? Social connections     Talks on phone: Not on file     Gets together: Not on file     Attends Oriental orthodox service: Not on file     Active member of club or organization: Not on file     Attends meetings of clubs or  organizations: Not on file     Relationship status: Not on file   ? Intimate partner violence     Fear of current or ex partner: Not on file     Emotionally abused: Not on file     Physically abused: Not on file     Forced sexual activity: Not on file   Other Topics Concern   ? Not on file   Social History Narrative   ? Not on file       Family History   Problem Relation Age of Onset   ? Stroke Mother    ? Stroke Father    ? No Medical Problems Sister        Review of Systems:  Review of systems is otherwise negative, except as noted above.  Full 12 point review of systems was completed.    Radiology/Diagnostic Studies:    I personally reviewed the following imaging results today and those on care everywhere, if indicated    EXAM: MR FOREFOOT W WO CONTRAST LEFT  LOCATION: [facility]  DATE/TIME: 1/5/2021 3:51 PM     INDICATION: Osteomyelitis suspected, diabetic foot infection.  COMPARISON: 1/5/2021 radiograph.  TECHNIQUE: Routine. Additional postgadolinium T1 sequences were obtained.  IV CONTRAST: Gadavist 10 mL.     FINDINGS:  There is an irregular, deep soft tissue ulceration along the plantar aspect of the foot at the level of the 1st MTP joint. The ulcer appears to extend to close to the plantar joint capsule of the 1st MTP joint as well as the flexor hallucis tendon. There   is patchy enhancement and edema of the lateral great toe sesamoid. The medial great toe sesamoid shows mild bone marrow edema without significant enhancement. There is a moderate 1st MTP joint effusion with synovitis. There is distal tenosynovitis   involving the flexor hallucis tendon.     The remaining bony structures of the foot show no abnormal enhancement or marrow edema signal. There is no acute fracture. Remaining tendons show no acute abnormality. There is no rim-enhancing fluid collection. Fatty atrophy of the intrinsic muscles of   the foot.     There is soft tissue swelling and reticular enhancement along  the dorsal foot.     IMPRESSION:   1.  Deep plantar soft tissue ulceration along the plantar aspect of the 1st MTP joint. There is bone enhancement and signal changes of the lateral great toe sesamoid which either represent reactive changes or early osteomyelitis.     2.  Moderate joint effusion and synovitis of the 1st MTP joint may be reactive, however, early infection is difficult to exclude. Correlation with ulcer depth is needed. Close interval follow-up is advised.     3.  Soft tissue swelling along the dorsal aspect of the foot is either related to a soft tissue infection or edema.     4.  Distal flexor hallucis tenosynovitis, either infectious or reactive.     5.  No discrete abscess.    Laboratory Studies:  I personally reviewed the following lab results today and those on care everywhere, if indicated      Lab Results   Component Value Date    SEDRATE 102 (H) 01/05/2021         Lab Results   Component Value Date    CRP 22.0 (H) 01/05/2021           Lab Results   Component Value Date    CREATININE 0.67 (L) 01/26/2021      Lab Results   Component Value Date    HGBA1C 5.9 (H) 02/22/2021           Lab Results   Component Value Date    BUN 13 01/11/2021              Lab Results   Component Value Date    ALBUMIN 2.4 (L) 01/06/2021       No results found for: [id number]    Lab Results   Component Value Date    TSH 2.11 04/13/2016     Lab Results   Component Value Date    WBC 9.5 01/11/2021    HGB 11.2 (L) 01/11/2021    HCT 33.5 (L) 01/11/2021    MCV 89 01/11/2021     (H) 01/14/2021       Physical Exam:  Vitals:    03/16/21 1139   BP: 138/58   Pulse: 76   Resp: 20   Temp: 98.1  F (36.7  C)     BMI 29.24   Weight 198 pounds    See flow chart for circumferential measures.    No flowsheet data found.    Drain Left Foot 10 Fr. (Active)       VASC Wound 08/24/20 left plantar hallux (Active)   Pre Size Length 2.5 12/23/20 1100   Pre Size Width 2.3 12/23/20 1100   Pre Size Depth 0.8 12/23/20 1100   Pre Total Sq  cm 5.75 12/23/20 1100   Post Size Length 2.2 12/23/20 1100   Post Size Width 2 12/23/20 1100   Post Size Depth 0.8 12/23/20 1100   Post Total Sq cm 4.4 12/23/20 1100   Undermined no 10/28/20 1300   Tunneling no 10/28/20 1300   Description pink,epibole 10/28/20 1300   Prodcut Used Gauze 10/28/20 1300       VASC Wound 03/16/21 left 2nd toe (Active)   Pre Size Length 0.6 03/16/21 1100   Pre Size Width 0.8 03/16/21 1100   Pre Size Depth 0.2 03/16/21 1100   Pre Total Sq cm 0.48 03/16/21 1100       Incision 01/08/21 Surgical Foot Left (Active)        General:  75 y.o. male in no apparent distress.      Psych: Alert and oriented x 3.  Cooperative. Affect normal.    HEENT: Atraumatic, normocephalic    Musculoskeletal:  Normal range of motion in hips, knees and ankles bilaterally.  There is no active joint synovitis, erythema, swelling or joint laxity.     Neurological:  Sensation is decreased to pin prick and light touch in a stocking distribution.  Strength testing is normal in hip flexion, knee flexion, knee extension, ankle dorsiflexion bilaterally and great toe extension R and L second toe extension. Deep tendon reflexes knee jerks and ankle jerks are not present.      Vascular: Dorsalis pedis and posterior tibialis pulses are strong and equal bilaterally. See ABIs.  There are no significant telangietasias, medial ankle venous flares, venous varicosities  and spider veins .     Integumentary: Skin of the legs is uniformly warm and dry and and hyperpigmentated.  Nails are thickened, thin surrounding skin, decreased hair growth on toes, discolored and brittle.  On the left distal 2nd digit (first is amputated) there is a large area of callus with central ulceration.  There is significant slough and friable tissue.  There is erythema and rubor to the distal toe with no pain to palpation.  There is no odor.  Discharge is moderate serous fluid.  During debridement bone could be easily palpated.  See flow sheet and  photos.        Left toe      Impression:    1. Left diabetic toe ulcer  2. Osteomyelitis left foot/toe  3. Status post left partial foot amp  4. Foot deformity due to above.  5. Type 2 diabetes with peripheral neuropathy    Plan:  1. Exam highly suggestive of osteomyelitis.   This was reviewed in detail with the patient.  Questions were answered and education was completed.  2. We discussed diabetic foot care today.  We reviewed the importance of always wearing hard soles on the feet and never going barefoot or just in socks.  Feet should be checked twice a day, if necessary using a mirror on the floor to check the bottom of the foot.  The importance of control of diabetes with their PCP or endocrinologist was discussed. If the patient has diabetic shoes and insoles those should be the primary shoes worn.  3. I suspect the ball of the shoe he has is too tight and with slight hammer toe he sustained recurrent injury to the left distal second digit.  He also could have injured it unknowingly while walking just in socks.   4. After discussion of risk factors and consent obtained 2% Lidocaine HCL jelly was applied, under clean conditions, the left, foot and diabetic ulceration(s) were debrided using currette and #15 blade scalpel.  Devitalized and non viable tissue, along with any fibrin and slough, was removed to improve granulation tissue formation, stimulate wound healing, decrease overall bacteria load, disrupt biofilm formation and decrease edge senescence.  Total excisional debridement was 0.6 sq cm into the muscle/fascia.   Ulcers were  improved afterwards and .  Measures were as noted on the flow sheet .   5. Wound treatment will include irrigation and dressings to promote autolytic debridement and will be:  Left toe-hibiclens wash then NS rinse then Medihoney alginate pack litely in wound then foam with silicon border.  Change 3 times a week.  [facility]. To see Dr. Zacarias garcia.  To wear off loading boot on  left anytime he is up.  Minimal weight bearing, if any.  Ok for right diabetic shoe.  If fever >100.5 or quickly worsening to ED.  6. Because of underlying concern of cellulitis and wound infection, Rosales swab culture technique was used to send off for aerobic Gram stain and culture from the left toe ulcer. Lab notified patient just finishing Cephalosporin.  (Keflex?)  7. Empirically start clindamycin in diabetic.  8. Patient will be seeing Dr. Adames as soon as we can get him in.  If Dr. Adames would like I could see him in about 2-3 months to work with gait and mechanics to try and decrease risk of recurrent infections and amputations. If any questions or concerns they are to contact the clinic.     Thank you very much for referring Prosper Kirby.  If you have any questions please feel free to contact me at [phone].      On day of encounter time spent in chart review and with patient in consultation, exam, education and coordination of care:  45 minutes     Geneva Mcneil MD, Founding Diplomate ABGLORIA, FACCWS, FAAPMR  Medical Director Wound Care and Lymphedema  [facility]  [phone]    This note was dictated using a voice recognition software.  Any grammatical or context distortion are unintentional and inherent to the software.

## 2021-07-04 NOTE — ADDENDUM NOTE
Addendum Note by Candy Anderson RN at 2/11/2021 12:20 PM     Author: Candy Anderson RN Service: -- Author Type: Registered Nurse    Filed: 2/11/2021  5:08 PM Encounter Date: 2/11/2021 Status: Signed    : Candy Anderson RN (Registered Nurse)    Addended by: CANDY ANDERSON on: 2/11/2021 05:08 PM        Modules accepted: Orders

## 2021-08-15 ENCOUNTER — HEALTH MAINTENANCE LETTER (OUTPATIENT)
Age: 76
End: 2021-08-15

## 2021-09-24 ENCOUNTER — TRANSCRIBE ORDERS (OUTPATIENT)
Dept: VASCULAR SURGERY | Facility: CLINIC | Age: 76
End: 2021-09-24

## 2021-09-24 DIAGNOSIS — E11.9 DIABETES MELLITUS (H): ICD-10-CM

## 2021-09-24 DIAGNOSIS — M79.671 BILATERAL FOOT PAIN: Primary | ICD-10-CM

## 2021-09-24 DIAGNOSIS — M79.672 BILATERAL FOOT PAIN: Primary | ICD-10-CM

## 2021-10-08 ENCOUNTER — LAB REQUISITION (OUTPATIENT)
Dept: LAB | Facility: CLINIC | Age: 76
End: 2021-10-08
Payer: COMMERCIAL

## 2021-10-08 DIAGNOSIS — E11.9 TYPE 2 DIABETES MELLITUS WITHOUT COMPLICATIONS (H): ICD-10-CM

## 2021-10-08 DIAGNOSIS — R53.83 OTHER FATIGUE: ICD-10-CM

## 2021-10-08 DIAGNOSIS — R82.998 OTHER ABNORMAL FINDINGS IN URINE: ICD-10-CM

## 2021-10-08 DIAGNOSIS — Z13.6 ENCOUNTER FOR SCREENING FOR CARDIOVASCULAR DISORDERS: ICD-10-CM

## 2021-10-08 DIAGNOSIS — Z13.220 ENCOUNTER FOR SCREENING FOR LIPOID DISORDERS: ICD-10-CM

## 2021-10-08 DIAGNOSIS — Z12.5 ENCOUNTER FOR SCREENING FOR MALIGNANT NEOPLASM OF PROSTATE: ICD-10-CM

## 2021-10-08 LAB
ALBUMIN SERPL-MCNC: 3.9 G/DL (ref 3.5–5)
ALBUMIN UR-MCNC: 50 MG/DL
ALP SERPL-CCNC: 55 U/L (ref 45–120)
ALT SERPL W P-5'-P-CCNC: 47 U/L (ref 0–45)
ANION GAP SERPL CALCULATED.3IONS-SCNC: 12 MMOL/L (ref 5–18)
APPEARANCE UR: CLEAR
AST SERPL W P-5'-P-CCNC: 42 U/L (ref 0–40)
BASOPHILS # BLD AUTO: 0.1 10E3/UL (ref 0–0.2)
BASOPHILS NFR BLD AUTO: 1 %
BILIRUB SERPL-MCNC: 0.6 MG/DL (ref 0–1)
BILIRUB UR QL STRIP: NEGATIVE
BUN SERPL-MCNC: 21 MG/DL (ref 8–28)
CALCIUM SERPL-MCNC: 9.5 MG/DL (ref 8.5–10.5)
CHLORIDE BLD-SCNC: 106 MMOL/L (ref 98–107)
CHOLEST SERPL-MCNC: 171 MG/DL
CO2 SERPL-SCNC: 21 MMOL/L (ref 22–31)
COLOR UR AUTO: ABNORMAL
CREAT SERPL-MCNC: 0.96 MG/DL (ref 0.7–1.3)
EOSINOPHIL # BLD AUTO: 0.3 10E3/UL (ref 0–0.7)
EOSINOPHIL NFR BLD AUTO: 3 %
ERYTHROCYTE [DISTWIDTH] IN BLOOD BY AUTOMATED COUNT: 13.1 % (ref 10–15)
FASTING STATUS PATIENT QL REPORTED: ABNORMAL
GFR SERPL CREATININE-BSD FRML MDRD: 77 ML/MIN/1.73M2
GLUCOSE BLD-MCNC: 202 MG/DL (ref 70–125)
GLUCOSE UR STRIP-MCNC: NEGATIVE MG/DL
HBA1C MFR BLD: 7.7 %
HCT VFR BLD AUTO: 40.2 % (ref 40–53)
HDLC SERPL-MCNC: 40 MG/DL
HGB BLD-MCNC: 14.1 G/DL (ref 13.3–17.7)
HGB UR QL STRIP: ABNORMAL
HYALINE CASTS: 3 /LPF
IMM GRANULOCYTES # BLD: 0.1 10E3/UL
IMM GRANULOCYTES NFR BLD: 1 %
KETONES UR STRIP-MCNC: NEGATIVE MG/DL
LDLC SERPL CALC-MCNC: 89 MG/DL
LEUKOCYTE ESTERASE UR QL STRIP: NEGATIVE
LYMPHOCYTES # BLD AUTO: 2.2 10E3/UL (ref 0.8–5.3)
LYMPHOCYTES NFR BLD AUTO: 22 %
MCH RBC QN AUTO: 31.3 PG (ref 26.5–33)
MCHC RBC AUTO-ENTMCNC: 35.1 G/DL (ref 31.5–36.5)
MCV RBC AUTO: 89 FL (ref 78–100)
MONOCYTES # BLD AUTO: 0.8 10E3/UL (ref 0–1.3)
MONOCYTES NFR BLD AUTO: 8 %
MUCOUS THREADS #/AREA URNS LPF: PRESENT /LPF
NEUTROPHILS # BLD AUTO: 6.5 10E3/UL (ref 1.6–8.3)
NEUTROPHILS NFR BLD AUTO: 65 %
NITRATE UR QL: NEGATIVE
NRBC # BLD AUTO: 0 10E3/UL
NRBC BLD AUTO-RTO: 0 /100
PH UR STRIP: 5 [PH] (ref 5–7)
PLATELET # BLD AUTO: 263 10E3/UL (ref 150–450)
POTASSIUM BLD-SCNC: 5 MMOL/L (ref 3.5–5)
PROT SERPL-MCNC: 7.3 G/DL (ref 6–8)
PSA SERPL-MCNC: 3.22 UG/L (ref 0–6.5)
RBC # BLD AUTO: 4.5 10E6/UL (ref 4.4–5.9)
RBC URINE: 1 /HPF
SODIUM SERPL-SCNC: 139 MMOL/L (ref 136–145)
SP GR UR STRIP: 1.02 (ref 1–1.03)
SQUAMOUS EPITHELIAL: 1 /HPF
TRIGL SERPL-MCNC: 210 MG/DL
UROBILINOGEN UR STRIP-MCNC: <2 MG/DL
WBC # BLD AUTO: 9.9 10E3/UL (ref 4–11)
WBC URINE: 1 /HPF

## 2021-10-08 PROCEDURE — 80053 COMPREHEN METABOLIC PANEL: CPT | Mod: ORL | Performed by: FAMILY MEDICINE

## 2021-10-08 PROCEDURE — 81001 URINALYSIS AUTO W/SCOPE: CPT | Mod: ORL | Performed by: FAMILY MEDICINE

## 2021-10-08 PROCEDURE — 80061 LIPID PANEL: CPT | Mod: ORL | Performed by: FAMILY MEDICINE

## 2021-10-08 PROCEDURE — 85025 COMPLETE CBC W/AUTO DIFF WBC: CPT | Mod: ORL | Performed by: FAMILY MEDICINE

## 2021-10-08 PROCEDURE — 83036 HEMOGLOBIN GLYCOSYLATED A1C: CPT | Mod: ORL | Performed by: FAMILY MEDICINE

## 2021-10-08 PROCEDURE — G0103 PSA SCREENING: HCPCS | Mod: ORL | Performed by: FAMILY MEDICINE

## 2021-10-11 ENCOUNTER — HEALTH MAINTENANCE LETTER (OUTPATIENT)
Age: 76
End: 2021-10-11

## 2021-11-10 ENCOUNTER — LAB REQUISITION (OUTPATIENT)
Dept: LAB | Facility: CLINIC | Age: 76
End: 2021-11-10
Payer: COMMERCIAL

## 2021-11-10 DIAGNOSIS — R53.83 OTHER FATIGUE: ICD-10-CM

## 2021-11-10 LAB
BASOPHILS # BLD AUTO: 0.1 10E3/UL (ref 0–0.2)
BASOPHILS NFR BLD AUTO: 1 %
EOSINOPHIL # BLD AUTO: 0.3 10E3/UL (ref 0–0.7)
EOSINOPHIL NFR BLD AUTO: 3 %
ERYTHROCYTE [DISTWIDTH] IN BLOOD BY AUTOMATED COUNT: 12.4 % (ref 10–15)
HCT VFR BLD AUTO: 41.5 % (ref 40–53)
HGB BLD-MCNC: 14.4 G/DL (ref 13.3–17.7)
IMM GRANULOCYTES # BLD: 0 10E3/UL
IMM GRANULOCYTES NFR BLD: 0 %
LYMPHOCYTES # BLD AUTO: 2.6 10E3/UL (ref 0.8–5.3)
LYMPHOCYTES NFR BLD AUTO: 26 %
MCH RBC QN AUTO: 31.2 PG (ref 26.5–33)
MCHC RBC AUTO-ENTMCNC: 34.7 G/DL (ref 31.5–36.5)
MCV RBC AUTO: 90 FL (ref 78–100)
MONOCYTES # BLD AUTO: 0.8 10E3/UL (ref 0–1.3)
MONOCYTES NFR BLD AUTO: 8 %
NEUTROPHILS # BLD AUTO: 6 10E3/UL (ref 1.6–8.3)
NEUTROPHILS NFR BLD AUTO: 62 %
NRBC # BLD AUTO: 0 10E3/UL
NRBC BLD AUTO-RTO: 0 /100
PLATELET # BLD AUTO: 266 10E3/UL (ref 150–450)
RBC # BLD AUTO: 4.62 10E6/UL (ref 4.4–5.9)
WBC # BLD AUTO: 9.9 10E3/UL (ref 4–11)

## 2021-11-10 PROCEDURE — 85025 COMPLETE CBC W/AUTO DIFF WBC: CPT | Mod: ORL | Performed by: NURSE PRACTITIONER

## 2022-01-25 ENCOUNTER — IMMUNIZATION (OUTPATIENT)
Dept: NURSING | Facility: CLINIC | Age: 77
End: 2022-01-25
Payer: COMMERCIAL

## 2022-01-25 PROCEDURE — 91305 COVID-19,PF,PFIZER (12+ YRS): CPT

## 2022-01-25 PROCEDURE — 0054A COVID-19,PF,PFIZER (12+ YRS): CPT

## 2022-01-30 ENCOUNTER — HEALTH MAINTENANCE LETTER (OUTPATIENT)
Age: 77
End: 2022-01-30

## 2022-05-22 ENCOUNTER — HEALTH MAINTENANCE LETTER (OUTPATIENT)
Age: 77
End: 2022-05-22

## 2022-09-24 ENCOUNTER — HEALTH MAINTENANCE LETTER (OUTPATIENT)
Age: 77
End: 2022-09-24

## 2022-11-08 ENCOUNTER — LAB REQUISITION (OUTPATIENT)
Dept: LAB | Facility: CLINIC | Age: 77
End: 2022-11-08
Payer: COMMERCIAL

## 2022-11-08 DIAGNOSIS — Z12.5 ENCOUNTER FOR SCREENING FOR MALIGNANT NEOPLASM OF PROSTATE: ICD-10-CM

## 2022-11-08 DIAGNOSIS — E11.9 TYPE 2 DIABETES MELLITUS WITHOUT COMPLICATIONS (H): ICD-10-CM

## 2022-11-08 DIAGNOSIS — I10 ESSENTIAL (PRIMARY) HYPERTENSION: ICD-10-CM

## 2022-11-08 LAB
ALBUMIN SERPL BCG-MCNC: 4.4 G/DL (ref 3.5–5.2)
ALP SERPL-CCNC: 68 U/L (ref 40–129)
ALT SERPL W P-5'-P-CCNC: 20 U/L (ref 10–50)
ANION GAP SERPL CALCULATED.3IONS-SCNC: 15 MMOL/L (ref 7–15)
AST SERPL W P-5'-P-CCNC: 18 U/L (ref 10–50)
BASOPHILS # BLD AUTO: 0.1 10E3/UL (ref 0–0.2)
BASOPHILS NFR BLD AUTO: 1 %
BILIRUB SERPL-MCNC: 0.3 MG/DL
BUN SERPL-MCNC: 41.6 MG/DL (ref 8–23)
CALCIUM SERPL-MCNC: 9.6 MG/DL (ref 8.8–10.2)
CHLORIDE SERPL-SCNC: 105 MMOL/L (ref 98–107)
CHOLEST SERPL-MCNC: 197 MG/DL
CREAT SERPL-MCNC: 1.59 MG/DL (ref 0.67–1.17)
DEPRECATED HCO3 PLAS-SCNC: 19 MMOL/L (ref 22–29)
EOSINOPHIL # BLD AUTO: 0.4 10E3/UL (ref 0–0.7)
EOSINOPHIL NFR BLD AUTO: 4 %
ERYTHROCYTE [DISTWIDTH] IN BLOOD BY AUTOMATED COUNT: 13.7 % (ref 10–15)
GFR SERPL CREATININE-BSD FRML MDRD: 45 ML/MIN/1.73M2
GLUCOSE SERPL-MCNC: 153 MG/DL (ref 70–99)
HBA1C MFR BLD: 6.3 %
HCT VFR BLD AUTO: 41.5 % (ref 40–53)
HDLC SERPL-MCNC: 34 MG/DL
HGB BLD-MCNC: 13.3 G/DL (ref 13.3–17.7)
IMM GRANULOCYTES # BLD: 0 10E3/UL
IMM GRANULOCYTES NFR BLD: 0 %
LDLC SERPL CALC-MCNC: 113 MG/DL
LYMPHOCYTES # BLD AUTO: 2.3 10E3/UL (ref 0.8–5.3)
LYMPHOCYTES NFR BLD AUTO: 21 %
MCH RBC QN AUTO: 28.9 PG (ref 26.5–33)
MCHC RBC AUTO-ENTMCNC: 32 G/DL (ref 31.5–36.5)
MCV RBC AUTO: 90 FL (ref 78–100)
MONOCYTES # BLD AUTO: 0.9 10E3/UL (ref 0–1.3)
MONOCYTES NFR BLD AUTO: 8 %
NEUTROPHILS # BLD AUTO: 7.3 10E3/UL (ref 1.6–8.3)
NEUTROPHILS NFR BLD AUTO: 66 %
NONHDLC SERPL-MCNC: 163 MG/DL
NRBC # BLD AUTO: 0 10E3/UL
NRBC BLD AUTO-RTO: 0 /100
PLATELET # BLD AUTO: 328 10E3/UL (ref 150–450)
POTASSIUM SERPL-SCNC: 5.7 MMOL/L (ref 3.4–5.3)
PROT SERPL-MCNC: 7.2 G/DL (ref 6.4–8.3)
PSA SERPL-MCNC: 5.84 NG/ML (ref 0–6.5)
RBC # BLD AUTO: 4.6 10E6/UL (ref 4.4–5.9)
SODIUM SERPL-SCNC: 139 MMOL/L (ref 136–145)
TRIGL SERPL-MCNC: 252 MG/DL
WBC # BLD AUTO: 11.1 10E3/UL (ref 4–11)

## 2022-11-08 PROCEDURE — 85025 COMPLETE CBC W/AUTO DIFF WBC: CPT | Mod: ORL | Performed by: NURSE PRACTITIONER

## 2022-11-08 PROCEDURE — 80053 COMPREHEN METABOLIC PANEL: CPT | Mod: ORL | Performed by: NURSE PRACTITIONER

## 2022-11-08 PROCEDURE — 83036 HEMOGLOBIN GLYCOSYLATED A1C: CPT | Mod: ORL | Performed by: NURSE PRACTITIONER

## 2022-11-08 PROCEDURE — 80061 LIPID PANEL: CPT | Mod: ORL | Performed by: NURSE PRACTITIONER

## 2022-11-08 PROCEDURE — G0103 PSA SCREENING: HCPCS | Mod: ORL | Performed by: NURSE PRACTITIONER

## 2023-01-09 ENCOUNTER — LAB REQUISITION (OUTPATIENT)
Dept: LAB | Facility: CLINIC | Age: 78
End: 2023-01-09
Payer: COMMERCIAL

## 2023-01-09 DIAGNOSIS — E11.9 TYPE 2 DIABETES MELLITUS WITHOUT COMPLICATIONS (H): ICD-10-CM

## 2023-01-09 LAB
ALBUMIN SERPL BCG-MCNC: 4.4 G/DL (ref 3.5–5.2)
ALP SERPL-CCNC: 68 U/L (ref 40–129)
ALT SERPL W P-5'-P-CCNC: 20 U/L (ref 10–50)
ANION GAP SERPL CALCULATED.3IONS-SCNC: 15 MMOL/L (ref 7–15)
AST SERPL W P-5'-P-CCNC: 21 U/L (ref 10–50)
BASOPHILS # BLD AUTO: 0.1 10E3/UL (ref 0–0.2)
BASOPHILS NFR BLD AUTO: 1 %
BILIRUB SERPL-MCNC: 0.4 MG/DL
BUN SERPL-MCNC: 42.8 MG/DL (ref 8–23)
CALCIUM SERPL-MCNC: 10.4 MG/DL (ref 8.8–10.2)
CHLORIDE SERPL-SCNC: 103 MMOL/L (ref 98–107)
CHOLEST SERPL-MCNC: 172 MG/DL
CREAT SERPL-MCNC: 1.54 MG/DL (ref 0.67–1.17)
DEPRECATED HCO3 PLAS-SCNC: 19 MMOL/L (ref 22–29)
EOSINOPHIL # BLD AUTO: 0.3 10E3/UL (ref 0–0.7)
EOSINOPHIL NFR BLD AUTO: 3 %
ERYTHROCYTE [DISTWIDTH] IN BLOOD BY AUTOMATED COUNT: 13.2 % (ref 10–15)
GFR SERPL CREATININE-BSD FRML MDRD: 46 ML/MIN/1.73M2
GLUCOSE SERPL-MCNC: 142 MG/DL (ref 70–99)
HBA1C MFR BLD: 7.4 %
HCT VFR BLD AUTO: 42.5 % (ref 40–53)
HDLC SERPL-MCNC: 39 MG/DL
HGB BLD-MCNC: 13.9 G/DL (ref 13.3–17.7)
IMM GRANULOCYTES # BLD: 0 10E3/UL
IMM GRANULOCYTES NFR BLD: 0 %
LDLC SERPL CALC-MCNC: 103 MG/DL
LYMPHOCYTES # BLD AUTO: 2.1 10E3/UL (ref 0.8–5.3)
LYMPHOCYTES NFR BLD AUTO: 19 %
MCH RBC QN AUTO: 29.6 PG (ref 26.5–33)
MCHC RBC AUTO-ENTMCNC: 32.7 G/DL (ref 31.5–36.5)
MCV RBC AUTO: 90 FL (ref 78–100)
MONOCYTES # BLD AUTO: 0.9 10E3/UL (ref 0–1.3)
MONOCYTES NFR BLD AUTO: 9 %
NEUTROPHILS # BLD AUTO: 7.3 10E3/UL (ref 1.6–8.3)
NEUTROPHILS NFR BLD AUTO: 68 %
NONHDLC SERPL-MCNC: 133 MG/DL
NRBC # BLD AUTO: 0 10E3/UL
NRBC BLD AUTO-RTO: 0 /100
PLATELET # BLD AUTO: 282 10E3/UL (ref 150–450)
POTASSIUM SERPL-SCNC: 5.6 MMOL/L (ref 3.4–5.3)
PROT SERPL-MCNC: 7 G/DL (ref 6.4–8.3)
RBC # BLD AUTO: 4.7 10E6/UL (ref 4.4–5.9)
SODIUM SERPL-SCNC: 137 MMOL/L (ref 136–145)
TRIGL SERPL-MCNC: 151 MG/DL
WBC # BLD AUTO: 10.7 10E3/UL (ref 4–11)

## 2023-01-09 PROCEDURE — 83036 HEMOGLOBIN GLYCOSYLATED A1C: CPT | Mod: ORL | Performed by: FAMILY MEDICINE

## 2023-01-09 PROCEDURE — 80061 LIPID PANEL: CPT | Mod: ORL | Performed by: FAMILY MEDICINE

## 2023-01-09 PROCEDURE — 80053 COMPREHEN METABOLIC PANEL: CPT | Mod: ORL | Performed by: FAMILY MEDICINE

## 2023-01-09 PROCEDURE — 85025 COMPLETE CBC W/AUTO DIFF WBC: CPT | Mod: ORL | Performed by: FAMILY MEDICINE

## 2023-05-08 ENCOUNTER — HEALTH MAINTENANCE LETTER (OUTPATIENT)
Age: 78
End: 2023-05-08

## 2023-06-04 ENCOUNTER — LAB REQUISITION (OUTPATIENT)
Dept: LAB | Facility: CLINIC | Age: 78
End: 2023-06-04
Payer: COMMERCIAL

## 2023-06-04 DIAGNOSIS — N18.32 CHRONIC KIDNEY DISEASE, STAGE 3B (H): ICD-10-CM

## 2023-06-06 LAB
ANION GAP SERPL CALCULATED.3IONS-SCNC: 13 MMOL/L (ref 7–15)
BUN SERPL-MCNC: 34.9 MG/DL (ref 8–23)
CALCIUM SERPL-MCNC: 8.7 MG/DL (ref 8.8–10.2)
CHLORIDE SERPL-SCNC: 105 MMOL/L (ref 98–107)
CREAT SERPL-MCNC: 1.3 MG/DL (ref 0.67–1.17)
DEPRECATED HCO3 PLAS-SCNC: 19 MMOL/L (ref 22–29)
ERYTHROCYTE [DISTWIDTH] IN BLOOD BY AUTOMATED COUNT: 13.2 % (ref 10–15)
GFR SERPL CREATININE-BSD FRML MDRD: 57 ML/MIN/1.73M2
GLUCOSE SERPL-MCNC: 119 MG/DL (ref 70–99)
HCT VFR BLD AUTO: 34.4 % (ref 40–53)
HGB BLD-MCNC: 10.7 G/DL (ref 13.3–17.7)
MCH RBC QN AUTO: 30.3 PG (ref 26.5–33)
MCHC RBC AUTO-ENTMCNC: 31.1 G/DL (ref 31.5–36.5)
MCV RBC AUTO: 98 FL (ref 78–100)
PLATELET # BLD AUTO: 132 10E3/UL (ref 150–450)
POTASSIUM SERPL-SCNC: 5.9 MMOL/L (ref 3.4–5.3)
RBC # BLD AUTO: 3.53 10E6/UL (ref 4.4–5.9)
SODIUM SERPL-SCNC: 137 MMOL/L (ref 136–145)
WBC # BLD AUTO: 12.6 10E3/UL (ref 4–11)

## 2023-06-06 PROCEDURE — P9604 ONE-WAY ALLOW PRORATED TRIP: HCPCS | Mod: ORL | Performed by: INTERNAL MEDICINE

## 2023-06-06 PROCEDURE — 36415 COLL VENOUS BLD VENIPUNCTURE: CPT | Mod: ORL | Performed by: INTERNAL MEDICINE

## 2023-06-06 PROCEDURE — 85027 COMPLETE CBC AUTOMATED: CPT | Mod: ORL | Performed by: INTERNAL MEDICINE

## 2023-06-06 PROCEDURE — 80048 BASIC METABOLIC PNL TOTAL CA: CPT | Mod: ORL | Performed by: INTERNAL MEDICINE

## 2023-06-23 PROCEDURE — 81001 URINALYSIS AUTO W/SCOPE: CPT | Performed by: INTERNAL MEDICINE

## 2023-06-23 PROCEDURE — 87086 URINE CULTURE/COLONY COUNT: CPT | Mod: ORL | Performed by: INTERNAL MEDICINE

## 2023-06-24 ENCOUNTER — LAB REQUISITION (OUTPATIENT)
Dept: LAB | Facility: CLINIC | Age: 78
End: 2023-06-24
Payer: COMMERCIAL

## 2023-06-24 DIAGNOSIS — N39.0 URINARY TRACT INFECTION, SITE NOT SPECIFIED: ICD-10-CM

## 2023-06-24 LAB
ALBUMIN UR-MCNC: 200 MG/DL
APPEARANCE UR: ABNORMAL
BILIRUB UR QL STRIP: NEGATIVE
COLOR UR AUTO: ABNORMAL
GLUCOSE UR STRIP-MCNC: NEGATIVE MG/DL
HGB UR QL STRIP: ABNORMAL
KETONES UR STRIP-MCNC: NEGATIVE MG/DL
LEUKOCYTE ESTERASE UR QL STRIP: ABNORMAL
NITRATE UR QL: NEGATIVE
PH UR STRIP: 5.5 [PH] (ref 5–7)
RBC URINE: 21 /HPF
SP GR UR STRIP: 1.01 (ref 1–1.03)
UROBILINOGEN UR STRIP-MCNC: NORMAL MG/DL
WBC URINE: >182 /HPF
YEAST #/AREA URNS HPF: ABNORMAL /HPF

## 2023-06-25 ENCOUNTER — LAB REQUISITION (OUTPATIENT)
Dept: LAB | Facility: CLINIC | Age: 78
End: 2023-06-25
Payer: COMMERCIAL

## 2023-06-25 DIAGNOSIS — E11.22 TYPE 2 DIABETES MELLITUS WITH DIABETIC CHRONIC KIDNEY DISEASE (H): ICD-10-CM

## 2023-06-25 LAB — BACTERIA UR CULT: NORMAL

## 2023-06-27 LAB
ANION GAP SERPL CALCULATED.3IONS-SCNC: 13 MMOL/L (ref 7–15)
BUN SERPL-MCNC: 31.6 MG/DL (ref 8–23)
CALCIUM SERPL-MCNC: 8.7 MG/DL (ref 8.8–10.2)
CHLORIDE SERPL-SCNC: 101 MMOL/L (ref 98–107)
CREAT SERPL-MCNC: 1.21 MG/DL (ref 0.67–1.17)
DEPRECATED HCO3 PLAS-SCNC: 22 MMOL/L (ref 22–29)
ERYTHROCYTE [DISTWIDTH] IN BLOOD BY AUTOMATED COUNT: 13 % (ref 10–15)
GFR SERPL CREATININE-BSD FRML MDRD: 62 ML/MIN/1.73M2
GLUCOSE SERPL-MCNC: 167 MG/DL (ref 70–99)
HCT VFR BLD AUTO: 36.2 % (ref 40–53)
HGB BLD-MCNC: 11.7 G/DL (ref 13.3–17.7)
MCH RBC QN AUTO: 29.2 PG (ref 26.5–33)
MCHC RBC AUTO-ENTMCNC: 32.3 G/DL (ref 31.5–36.5)
MCV RBC AUTO: 90 FL (ref 78–100)
PLATELET # BLD AUTO: 266 10E3/UL (ref 150–450)
POTASSIUM SERPL-SCNC: 4.7 MMOL/L (ref 3.4–5.3)
RBC # BLD AUTO: 4.01 10E6/UL (ref 4.4–5.9)
SODIUM SERPL-SCNC: 136 MMOL/L (ref 136–145)
WBC # BLD AUTO: 13 10E3/UL (ref 4–11)

## 2023-06-27 PROCEDURE — 85027 COMPLETE CBC AUTOMATED: CPT | Mod: ORL | Performed by: INTERNAL MEDICINE

## 2023-06-27 PROCEDURE — 80048 BASIC METABOLIC PNL TOTAL CA: CPT | Mod: ORL | Performed by: INTERNAL MEDICINE

## 2023-06-27 PROCEDURE — 36415 COLL VENOUS BLD VENIPUNCTURE: CPT | Mod: ORL | Performed by: INTERNAL MEDICINE

## 2023-06-27 PROCEDURE — P9604 ONE-WAY ALLOW PRORATED TRIP: HCPCS | Mod: ORL | Performed by: INTERNAL MEDICINE

## 2023-07-01 ENCOUNTER — LAB REQUISITION (OUTPATIENT)
Dept: LAB | Facility: CLINIC | Age: 78
End: 2023-07-01
Payer: COMMERCIAL

## 2023-07-01 DIAGNOSIS — R33.9 RETENTION OF URINE, UNSPECIFIED: ICD-10-CM

## 2023-07-03 LAB
ANION GAP SERPL CALCULATED.3IONS-SCNC: 16 MMOL/L (ref 7–15)
BUN SERPL-MCNC: 28.2 MG/DL (ref 8–23)
CALCIUM SERPL-MCNC: 9.3 MG/DL (ref 8.8–10.2)
CHLORIDE SERPL-SCNC: 96 MMOL/L (ref 98–107)
CREAT SERPL-MCNC: 1.13 MG/DL (ref 0.67–1.17)
DEPRECATED HCO3 PLAS-SCNC: 20 MMOL/L (ref 22–29)
ERYTHROCYTE [DISTWIDTH] IN BLOOD BY AUTOMATED COUNT: 12.8 % (ref 10–15)
GFR SERPL CREATININE-BSD FRML MDRD: 67 ML/MIN/1.73M2
GLUCOSE SERPL-MCNC: 208 MG/DL (ref 70–99)
HCT VFR BLD AUTO: 35.7 % (ref 40–53)
HGB BLD-MCNC: 11.5 G/DL (ref 13.3–17.7)
MCH RBC QN AUTO: 28.8 PG (ref 26.5–33)
MCHC RBC AUTO-ENTMCNC: 32.2 G/DL (ref 31.5–36.5)
MCV RBC AUTO: 90 FL (ref 78–100)
PLATELET # BLD AUTO: 345 10E3/UL (ref 150–450)
POTASSIUM SERPL-SCNC: 4.5 MMOL/L (ref 3.4–5.3)
RBC # BLD AUTO: 3.99 10E6/UL (ref 4.4–5.9)
SODIUM SERPL-SCNC: 132 MMOL/L (ref 136–145)
WBC # BLD AUTO: 15.7 10E3/UL (ref 4–11)

## 2023-07-03 PROCEDURE — P9604 ONE-WAY ALLOW PRORATED TRIP: HCPCS | Mod: ORL | Performed by: INTERNAL MEDICINE

## 2023-07-03 PROCEDURE — 85027 COMPLETE CBC AUTOMATED: CPT | Mod: ORL | Performed by: INTERNAL MEDICINE

## 2023-07-03 PROCEDURE — 36415 COLL VENOUS BLD VENIPUNCTURE: CPT | Mod: ORL | Performed by: INTERNAL MEDICINE

## 2023-07-03 PROCEDURE — 80048 BASIC METABOLIC PNL TOTAL CA: CPT | Mod: ORL | Performed by: INTERNAL MEDICINE

## 2023-07-05 ENCOUNTER — LAB REQUISITION (OUTPATIENT)
Dept: LAB | Facility: CLINIC | Age: 78
End: 2023-07-05
Payer: COMMERCIAL

## 2023-07-05 DIAGNOSIS — R53.1 WEAKNESS: ICD-10-CM

## 2023-07-05 PROCEDURE — 81001 URINALYSIS AUTO W/SCOPE: CPT | Mod: ORL | Performed by: INTERNAL MEDICINE

## 2023-07-05 PROCEDURE — 87106 FUNGI IDENTIFICATION YEAST: CPT | Mod: ORL | Performed by: INTERNAL MEDICINE

## 2023-07-06 ENCOUNTER — LAB REQUISITION (OUTPATIENT)
Dept: LAB | Facility: CLINIC | Age: 78
End: 2023-07-06
Payer: COMMERCIAL

## 2023-07-06 DIAGNOSIS — R53.1 WEAKNESS: ICD-10-CM

## 2023-07-06 LAB
ALBUMIN UR-MCNC: 100 MG/DL
ANION GAP SERPL CALCULATED.3IONS-SCNC: 11 MMOL/L (ref 7–15)
APPEARANCE UR: ABNORMAL
BILIRUB UR QL STRIP: NEGATIVE
BUN SERPL-MCNC: 24.6 MG/DL (ref 8–23)
CALCIUM SERPL-MCNC: 8.6 MG/DL (ref 8.8–10.2)
CHLORIDE SERPL-SCNC: 98 MMOL/L (ref 98–107)
COLOR UR AUTO: YELLOW
CREAT SERPL-MCNC: 1.12 MG/DL (ref 0.67–1.17)
DEPRECATED HCO3 PLAS-SCNC: 23 MMOL/L (ref 22–29)
ERYTHROCYTE [DISTWIDTH] IN BLOOD BY AUTOMATED COUNT: 13.2 % (ref 10–15)
GFR SERPL CREATININE-BSD FRML MDRD: 68 ML/MIN/1.73M2
GLUCOSE SERPL-MCNC: 234 MG/DL (ref 70–99)
GLUCOSE UR STRIP-MCNC: NEGATIVE MG/DL
HCT VFR BLD AUTO: 30.3 % (ref 40–53)
HGB BLD-MCNC: 9.9 G/DL (ref 13.3–17.7)
HGB UR QL STRIP: ABNORMAL
KETONES UR STRIP-MCNC: NEGATIVE MG/DL
LEUKOCYTE ESTERASE UR QL STRIP: ABNORMAL
MCH RBC QN AUTO: 28 PG (ref 26.5–33)
MCHC RBC AUTO-ENTMCNC: 32.7 G/DL (ref 31.5–36.5)
MCV RBC AUTO: 86 FL (ref 78–100)
MUCOUS THREADS #/AREA URNS LPF: PRESENT /LPF
NITRATE UR QL: NEGATIVE
PH UR STRIP: 5.5 [PH] (ref 5–7)
PLATELET # BLD AUTO: 288 10E3/UL (ref 150–450)
POTASSIUM SERPL-SCNC: 4.2 MMOL/L (ref 3.4–5.3)
RBC # BLD AUTO: 3.53 10E6/UL (ref 4.4–5.9)
RBC URINE: 28 /HPF
SODIUM SERPL-SCNC: 132 MMOL/L (ref 136–145)
SP GR UR STRIP: 1.01 (ref 1–1.03)
SQUAMOUS EPITHELIAL: 1 /HPF
TRANSITIONAL EPI: <1 /HPF
TSH SERPL DL<=0.005 MIU/L-ACNC: 1.48 UIU/ML (ref 0.3–4.2)
UROBILINOGEN UR STRIP-MCNC: NORMAL MG/DL
WBC # BLD AUTO: 10.1 10E3/UL (ref 4–11)
WBC CLUMPS #/AREA URNS HPF: PRESENT /HPF
WBC URINE: >182 /HPF
YEAST #/AREA URNS HPF: ABNORMAL /HPF

## 2023-07-06 PROCEDURE — 84443 ASSAY THYROID STIM HORMONE: CPT | Mod: ORL | Performed by: INTERNAL MEDICINE

## 2023-07-06 PROCEDURE — 36415 COLL VENOUS BLD VENIPUNCTURE: CPT | Mod: ORL | Performed by: INTERNAL MEDICINE

## 2023-07-06 PROCEDURE — 80048 BASIC METABOLIC PNL TOTAL CA: CPT | Mod: ORL | Performed by: INTERNAL MEDICINE

## 2023-07-06 PROCEDURE — 85027 COMPLETE CBC AUTOMATED: CPT | Mod: ORL | Performed by: INTERNAL MEDICINE

## 2023-07-06 PROCEDURE — P9604 ONE-WAY ALLOW PRORATED TRIP: HCPCS | Mod: ORL | Performed by: INTERNAL MEDICINE

## 2023-07-07 ENCOUNTER — LAB REQUISITION (OUTPATIENT)
Dept: LAB | Facility: CLINIC | Age: 78
End: 2023-07-07
Payer: COMMERCIAL

## 2023-07-08 ENCOUNTER — LAB REQUISITION (OUTPATIENT)
Dept: LAB | Facility: CLINIC | Age: 78
End: 2023-07-08
Payer: COMMERCIAL

## 2023-07-08 DIAGNOSIS — R82.90 UNSPECIFIED ABNORMAL FINDINGS IN URINE: ICD-10-CM

## 2023-07-08 LAB
ALBUMIN UR-MCNC: 300 MG/DL
APPEARANCE UR: ABNORMAL
BACTERIA #/AREA URNS HPF: ABNORMAL /HPF
BILIRUB UR QL STRIP: NEGATIVE
COLOR UR AUTO: YELLOW
GLUCOSE UR STRIP-MCNC: NEGATIVE MG/DL
HGB UR QL STRIP: ABNORMAL
KETONES UR STRIP-MCNC: NEGATIVE MG/DL
LEUKOCYTE ESTERASE UR QL STRIP: ABNORMAL
MUCOUS THREADS #/AREA URNS LPF: PRESENT /LPF
NITRATE UR QL: NEGATIVE
PH UR STRIP: 6.5 [PH] (ref 5–7)
RBC URINE: 59 /HPF
SP GR UR STRIP: 1.02 (ref 1–1.03)
UROBILINOGEN UR STRIP-MCNC: NORMAL MG/DL
WBC URINE: >182 /HPF

## 2023-07-08 PROCEDURE — 81001 URINALYSIS AUTO W/SCOPE: CPT | Mod: ORL | Performed by: INTERNAL MEDICINE

## 2023-07-08 PROCEDURE — 87086 URINE CULTURE/COLONY COUNT: CPT | Mod: ORL | Performed by: INTERNAL MEDICINE

## 2023-07-09 LAB
BACTERIA UR CULT: ABNORMAL
BACTERIA UR CULT: ABNORMAL

## 2023-07-11 LAB — BACTERIA UR CULT: ABNORMAL

## 2023-08-03 ENCOUNTER — LAB REQUISITION (OUTPATIENT)
Dept: LAB | Facility: CLINIC | Age: 78
End: 2023-08-03
Payer: COMMERCIAL

## 2023-08-03 DIAGNOSIS — I10 ESSENTIAL (PRIMARY) HYPERTENSION: ICD-10-CM

## 2023-08-03 DIAGNOSIS — E61.2 MAGNESIUM DEFICIENCY: ICD-10-CM

## 2023-08-04 LAB
ANION GAP SERPL CALCULATED.3IONS-SCNC: 11 MMOL/L (ref 7–15)
BUN SERPL-MCNC: 13.5 MG/DL (ref 8–23)
CALCIUM SERPL-MCNC: 8.8 MG/DL (ref 8.8–10.2)
CHLORIDE SERPL-SCNC: 99 MMOL/L (ref 98–107)
CREAT SERPL-MCNC: 0.85 MG/DL (ref 0.67–1.17)
DEPRECATED HCO3 PLAS-SCNC: 26 MMOL/L (ref 22–29)
GFR SERPL CREATININE-BSD FRML MDRD: 89 ML/MIN/1.73M2
GLUCOSE SERPL-MCNC: 106 MG/DL (ref 70–99)
POTASSIUM SERPL-SCNC: 4.2 MMOL/L (ref 3.4–5.3)
SODIUM SERPL-SCNC: 136 MMOL/L (ref 136–145)

## 2023-08-04 PROCEDURE — 36415 COLL VENOUS BLD VENIPUNCTURE: CPT | Mod: ORL | Performed by: FAMILY MEDICINE

## 2023-08-04 PROCEDURE — 80048 BASIC METABOLIC PNL TOTAL CA: CPT | Mod: ORL | Performed by: FAMILY MEDICINE

## 2023-08-07 ENCOUNTER — LAB REQUISITION (OUTPATIENT)
Dept: LAB | Facility: CLINIC | Age: 78
End: 2023-08-07
Payer: COMMERCIAL

## 2023-08-07 DIAGNOSIS — E61.2 MAGNESIUM DEFICIENCY: ICD-10-CM

## 2023-08-07 DIAGNOSIS — I10 ESSENTIAL (PRIMARY) HYPERTENSION: ICD-10-CM

## 2023-08-07 LAB — MAGNESIUM SERPL-MCNC: 1.8 MG/DL (ref 1.7–2.3)

## 2023-08-07 PROCEDURE — 36415 COLL VENOUS BLD VENIPUNCTURE: CPT | Mod: ORL | Performed by: FAMILY MEDICINE

## 2023-08-07 PROCEDURE — 83735 ASSAY OF MAGNESIUM: CPT | Mod: ORL | Performed by: FAMILY MEDICINE

## 2023-08-07 PROCEDURE — P9604 ONE-WAY ALLOW PRORATED TRIP: HCPCS | Mod: ORL | Performed by: FAMILY MEDICINE

## 2023-08-18 ENCOUNTER — LAB REQUISITION (OUTPATIENT)
Dept: LAB | Facility: CLINIC | Age: 78
End: 2023-08-18
Payer: COMMERCIAL

## 2023-08-18 DIAGNOSIS — I13.0 HYPERTENSIVE HEART AND CHRONIC KIDNEY DISEASE WITH HEART FAILURE AND STAGE 1 THROUGH STAGE 4 CHRONIC KIDNEY DISEASE, OR UNSPECIFIED CHRONIC KIDNEY DISEASE (H): ICD-10-CM

## 2023-08-22 ENCOUNTER — LAB REQUISITION (OUTPATIENT)
Dept: LAB | Facility: CLINIC | Age: 78
End: 2023-08-22
Payer: COMMERCIAL

## 2023-08-22 DIAGNOSIS — I13.0 HYPERTENSIVE HEART AND CHRONIC KIDNEY DISEASE WITH HEART FAILURE AND STAGE 1 THROUGH STAGE 4 CHRONIC KIDNEY DISEASE, OR UNSPECIFIED CHRONIC KIDNEY DISEASE (H): ICD-10-CM

## 2023-08-23 LAB
ANION GAP SERPL CALCULATED.3IONS-SCNC: 13 MMOL/L (ref 7–15)
BUN SERPL-MCNC: 24.3 MG/DL (ref 8–23)
CALCIUM SERPL-MCNC: 9 MG/DL (ref 8.8–10.2)
CHLORIDE SERPL-SCNC: 102 MMOL/L (ref 98–107)
CREAT SERPL-MCNC: 1.22 MG/DL (ref 0.67–1.17)
DEPRECATED HCO3 PLAS-SCNC: 24 MMOL/L (ref 22–29)
GFR SERPL CREATININE-BSD FRML MDRD: 61 ML/MIN/1.73M2
GLUCOSE SERPL-MCNC: 90 MG/DL (ref 70–99)
POTASSIUM SERPL-SCNC: 4.2 MMOL/L (ref 3.4–5.3)
SODIUM SERPL-SCNC: 139 MMOL/L (ref 136–145)

## 2023-08-23 PROCEDURE — 36415 COLL VENOUS BLD VENIPUNCTURE: CPT | Mod: ORL | Performed by: NURSE PRACTITIONER

## 2023-08-23 PROCEDURE — 80048 BASIC METABOLIC PNL TOTAL CA: CPT | Mod: ORL | Performed by: NURSE PRACTITIONER

## 2023-08-23 PROCEDURE — P9604 ONE-WAY ALLOW PRORATED TRIP: HCPCS | Mod: ORL | Performed by: NURSE PRACTITIONER

## 2023-10-04 ENCOUNTER — LAB REQUISITION (OUTPATIENT)
Dept: LAB | Facility: CLINIC | Age: 78
End: 2023-10-04
Payer: COMMERCIAL

## 2023-10-04 DIAGNOSIS — E11.9 TYPE 2 DIABETES MELLITUS WITHOUT COMPLICATIONS (H): ICD-10-CM

## 2023-10-04 LAB
ALBUMIN SERPL BCG-MCNC: 3.7 G/DL (ref 3.5–5.2)
ALP SERPL-CCNC: 126 U/L (ref 40–129)
ALT SERPL W P-5'-P-CCNC: 6 U/L (ref 0–70)
ANION GAP SERPL CALCULATED.3IONS-SCNC: 14 MMOL/L (ref 7–15)
AST SERPL W P-5'-P-CCNC: 18 U/L (ref 0–45)
BASO+EOS+MONOS # BLD AUTO: ABNORMAL 10*3/UL
BASO+EOS+MONOS NFR BLD AUTO: ABNORMAL %
BASOPHILS # BLD AUTO: 0.1 10E3/UL (ref 0–0.2)
BASOPHILS NFR BLD AUTO: 1 %
BILIRUB SERPL-MCNC: 0.4 MG/DL
BUN SERPL-MCNC: 34.4 MG/DL (ref 8–23)
CALCIUM SERPL-MCNC: 9.4 MG/DL (ref 8.8–10.2)
CHLORIDE SERPL-SCNC: 100 MMOL/L (ref 98–107)
CHOLEST SERPL-MCNC: 93 MG/DL
CREAT SERPL-MCNC: 0.9 MG/DL (ref 0.67–1.17)
DEPRECATED HCO3 PLAS-SCNC: 24 MMOL/L (ref 22–29)
EGFRCR SERPLBLD CKD-EPI 2021: 88 ML/MIN/1.73M2
EOSINOPHIL # BLD AUTO: 0.5 10E3/UL (ref 0–0.7)
EOSINOPHIL NFR BLD AUTO: 6 %
ERYTHROCYTE [DISTWIDTH] IN BLOOD BY AUTOMATED COUNT: 15 % (ref 10–15)
GLUCOSE SERPL-MCNC: 145 MG/DL (ref 70–99)
HBA1C MFR BLD: 6.2 %
HCT VFR BLD AUTO: 31.4 % (ref 40–53)
HDLC SERPL-MCNC: 33 MG/DL
HGB BLD-MCNC: 10.2 G/DL (ref 13.3–17.7)
IMM GRANULOCYTES # BLD: 0 10E3/UL
IMM GRANULOCYTES NFR BLD: 0 %
LDLC SERPL CALC-MCNC: 43 MG/DL
LYMPHOCYTES # BLD AUTO: 1.4 10E3/UL (ref 0.8–5.3)
LYMPHOCYTES NFR BLD AUTO: 17 %
MCH RBC QN AUTO: 28.8 PG (ref 26.5–33)
MCHC RBC AUTO-ENTMCNC: 32.5 G/DL (ref 31.5–36.5)
MCV RBC AUTO: 89 FL (ref 78–100)
MONOCYTES # BLD AUTO: 0.9 10E3/UL (ref 0–1.3)
MONOCYTES NFR BLD AUTO: 11 %
NEUTROPHILS # BLD AUTO: 5.5 10E3/UL (ref 1.6–8.3)
NEUTROPHILS NFR BLD AUTO: 65 %
NONHDLC SERPL-MCNC: 60 MG/DL
NRBC # BLD AUTO: 0 10E3/UL
NRBC BLD AUTO-RTO: 0 /100
PLATELET # BLD AUTO: 287 10E3/UL (ref 150–450)
POTASSIUM SERPL-SCNC: 4.3 MMOL/L (ref 3.4–5.3)
PROT SERPL-MCNC: 7.4 G/DL (ref 6.4–8.3)
RBC # BLD AUTO: 3.54 10E6/UL (ref 4.4–5.9)
SODIUM SERPL-SCNC: 138 MMOL/L (ref 135–145)
TRIGL SERPL-MCNC: 87 MG/DL
WBC # BLD AUTO: 8.4 10E3/UL (ref 4–11)

## 2023-10-04 PROCEDURE — 83036 HEMOGLOBIN GLYCOSYLATED A1C: CPT | Mod: ORL | Performed by: NURSE PRACTITIONER

## 2023-10-04 PROCEDURE — 80053 COMPREHEN METABOLIC PANEL: CPT | Mod: ORL | Performed by: NURSE PRACTITIONER

## 2023-10-04 PROCEDURE — 80061 LIPID PANEL: CPT | Mod: ORL | Performed by: NURSE PRACTITIONER

## 2023-10-04 PROCEDURE — 85025 COMPLETE CBC W/AUTO DIFF WBC: CPT | Mod: ORL | Performed by: NURSE PRACTITIONER

## 2023-10-14 ENCOUNTER — HEALTH MAINTENANCE LETTER (OUTPATIENT)
Age: 78
End: 2023-10-14

## 2024-02-25 ENCOUNTER — HEALTH MAINTENANCE LETTER (OUTPATIENT)
Age: 79
End: 2024-02-25

## 2024-07-14 ENCOUNTER — HEALTH MAINTENANCE LETTER (OUTPATIENT)
Age: 79
End: 2024-07-14

## 2024-12-01 ENCOUNTER — HEALTH MAINTENANCE LETTER (OUTPATIENT)
Age: 79
End: 2024-12-01